# Patient Record
Sex: FEMALE | Race: BLACK OR AFRICAN AMERICAN | NOT HISPANIC OR LATINO | URBAN - METROPOLITAN AREA
[De-identification: names, ages, dates, MRNs, and addresses within clinical notes are randomized per-mention and may not be internally consistent; named-entity substitution may affect disease eponyms.]

---

## 2020-09-20 ENCOUNTER — OUTPATIENT (OUTPATIENT)
Dept: OUTPATIENT SERVICES | Facility: HOSPITAL | Age: 35
LOS: 1 days | Discharge: HOME | End: 2020-09-20

## 2020-09-20 DIAGNOSIS — R71.8 OTHER ABNORMALITY OF RED BLOOD CELLS: ICD-10-CM

## 2020-09-23 ENCOUNTER — APPOINTMENT (OUTPATIENT)
Dept: HEMATOLOGY ONCOLOGY | Facility: CLINIC | Age: 35
End: 2020-09-23
Payer: COMMERCIAL

## 2020-09-23 DIAGNOSIS — Z78.9 OTHER SPECIFIED HEALTH STATUS: ICD-10-CM

## 2020-09-23 DIAGNOSIS — Z83.3 FAMILY HISTORY OF DIABETES MELLITUS: ICD-10-CM

## 2020-09-23 DIAGNOSIS — D56.9 THALASSEMIA, UNSPECIFIED: ICD-10-CM

## 2020-09-23 PROCEDURE — 99204 OFFICE O/P NEW MOD 45 MIN: CPT | Mod: 95

## 2020-09-28 ENCOUNTER — RESULT REVIEW (OUTPATIENT)
Age: 35
End: 2020-09-28

## 2020-09-28 ENCOUNTER — LABORATORY RESULT (OUTPATIENT)
Age: 35
End: 2020-09-28

## 2020-09-28 ENCOUNTER — APPOINTMENT (OUTPATIENT)
Dept: HEMATOLOGY ONCOLOGY | Facility: CLINIC | Age: 35
End: 2020-09-28

## 2020-09-28 LAB
BASOPHILS # BLD AUTO: 0.05 K/UL — SIGNIFICANT CHANGE UP (ref 0–0.2)
BASOPHILS NFR BLD AUTO: 0.8 % — SIGNIFICANT CHANGE UP (ref 0–2)
EOSINOPHIL # BLD AUTO: 0.07 K/UL — SIGNIFICANT CHANGE UP (ref 0–0.5)
EOSINOPHIL NFR BLD AUTO: 1.1 % — SIGNIFICANT CHANGE UP (ref 0–6)
HCT VFR BLD CALC: 37.8 % — SIGNIFICANT CHANGE UP (ref 34.5–45)
HGB BLD-MCNC: 11.6 G/DL — SIGNIFICANT CHANGE UP (ref 11.5–15.5)
IMM GRANULOCYTES NFR BLD AUTO: 0.2 % — SIGNIFICANT CHANGE UP (ref 0–1.5)
LYMPHOCYTES # BLD AUTO: 3.17 K/UL — SIGNIFICANT CHANGE UP (ref 1–3.3)
LYMPHOCYTES # BLD AUTO: 52 % — HIGH (ref 13–44)
MCHC RBC-ENTMCNC: 21.8 PG — LOW (ref 27–34)
MCHC RBC-ENTMCNC: 30.7 G/DL — LOW (ref 32–36)
MCV RBC AUTO: 71.1 FL — LOW (ref 80–100)
MONOCYTES # BLD AUTO: 0.56 K/UL — SIGNIFICANT CHANGE UP (ref 0–0.9)
MONOCYTES NFR BLD AUTO: 9.2 % — SIGNIFICANT CHANGE UP (ref 2–14)
NEUTROPHILS # BLD AUTO: 2.24 K/UL — SIGNIFICANT CHANGE UP (ref 1.8–7.4)
NEUTROPHILS NFR BLD AUTO: 36.7 % — LOW (ref 43–77)
NRBC # BLD: 0 /100 WBCS — SIGNIFICANT CHANGE UP (ref 0–0)
PLATELET # BLD AUTO: 433 K/UL — HIGH (ref 150–400)
RBC # BLD: 5.32 M/UL — HIGH (ref 3.8–5.2)
RBC # FLD: 18.1 % — HIGH (ref 10.3–14.5)
WBC # BLD: 6.1 K/UL — SIGNIFICANT CHANGE UP (ref 3.8–10.5)
WBC # FLD AUTO: 6.1 K/UL — SIGNIFICANT CHANGE UP (ref 3.8–10.5)

## 2020-09-29 LAB
RETICS #: 90.3 K/UL — SIGNIFICANT CHANGE UP (ref 25–125)
RETICS/RBC NFR: 1.7 % — SIGNIFICANT CHANGE UP (ref 0.5–2.5)

## 2020-10-06 ENCOUNTER — TRANSCRIPTION ENCOUNTER (OUTPATIENT)
Age: 35
End: 2020-10-06

## 2020-10-06 DIAGNOSIS — E61.1 IRON DEFICIENCY: ICD-10-CM

## 2020-10-06 LAB
ALBUMIN SERPL ELPH-MCNC: 4.4 G/DL
ALP BLD-CCNC: 95 U/L
ALT SERPL-CCNC: 29 U/L
ANION GAP SERPL CALC-SCNC: 11 MMOL/L
AST SERPL-CCNC: 21 U/L
BILIRUB INDIRECT SERPL-MCNC: 0.1 MG/DL
BILIRUB SERPL-MCNC: 0.2 MG/DL
BUN SERPL-MCNC: 9 MG/DL
CALCIUM SERPL-MCNC: 9 MG/DL
CHLORIDE SERPL-SCNC: 106 MMOL/L
CO2 SERPL-SCNC: 22 MMOL/L
CREAT SERPL-MCNC: 0.79 MG/DL
FERRITIN SERPL-MCNC: 23 NG/ML
FOLATE SERPL-MCNC: 17.9 NG/ML
GLUCOSE SERPL-MCNC: 105 MG/DL
HGB A MFR BLD: 97.6 %
HGB A2 MFR BLD: 2.4 %
HGB FRACT BLD-IMP: NORMAL
IRON SATN MFR SERPL: 10 %
IRON SERPL-MCNC: 44 UG/DL
JAK2RLX: NORMAL
LDH SERPL-CCNC: 203 U/L
POTASSIUM SERPL-SCNC: 4.2 MMOL/L
PROT SERPL-MCNC: 7.3 G/DL
SODIUM SERPL-SCNC: 139 MMOL/L
TIBC SERPL-MCNC: 426 UG/DL
UIBC SERPL-MCNC: 382 UG/DL
VIT B12 SERPL-MCNC: 777 PG/ML

## 2020-10-06 RX ORDER — FERROUS GLUCONATE 324(38)MG
324 (38 FE) TABLET ORAL DAILY
Qty: 90 | Refills: 1 | Status: ACTIVE | COMMUNITY
Start: 2020-10-06 | End: 1900-01-01

## 2020-10-06 NOTE — HISTORY OF PRESENT ILLNESS
[de-identified] : This is a 35 year old woman who  resents for the evaluation of an elevated RBC count. Hg 13.0 HCT 41.7% with a microcytic MCV 73.4fl, platelets 478 in March. \par Patient had bloodwork from the prior year.  Counts were the same.  \par \par Menstrual cycle has changed.  Patient 5/3 weight gain over last year.  Cycles have been heavier lately.  3-4 pads a day lasts an average of 5.  \par Patient of  descent.  Unsure of which region.  \par Patient was concerned about an ultrasound that was done recently. Report had stated something about a blood cancer.  REviewed the report with her.  THe ultrasound was actually normal,  in the history remarks it states she has a history of essential thrombocytosis.  \par \par Patient has history of a uterine myoma/fibroid.

## 2020-10-06 NOTE — ASSESSMENT
[FreeTextEntry1] : This is a 35 year old woman that presents for the evaluation of a mild thrombocytosis.    Platelet count was  478,000 in the setting of a normal hg of 13 with microcytosis 73.4fl.  Explained to her that it is highly unlikely that she has essential thrombocytosis, will check a GUILLERMINA 2 mutation to be sure.  THis usually comes at a more advanced age.  WIll check Hemoglobin electrophoresis, rule out thalassemia trait, check Iron studies, more likely scenario is a mild iron deficiency insufficient to cause an anemia, and leading to a reactive thrombocytosis.  \par \par Addendum 10/6/20\par Spoke to patient re: results. Patient's Hemoglobin electrophoresis normal, microcytosis does not appear to be from thalassemia trait.  Ferritin on lower end of normal at 23 with a high unsaturated TIBC and low % sat, suggestive of a mild iron deficiency. Recommended patient take an iron supplement ferrous gluconate 324(38) mg daily for 3 months and return to recheck.  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

## 2022-04-13 ENCOUNTER — RESULT REVIEW (OUTPATIENT)
Age: 37
End: 2022-04-13

## 2024-01-04 ENCOUNTER — NON-APPOINTMENT (OUTPATIENT)
Age: 39
End: 2024-01-04

## 2024-01-05 ENCOUNTER — APPOINTMENT (OUTPATIENT)
Dept: OBGYN | Facility: CLINIC | Age: 39
End: 2024-01-05
Payer: COMMERCIAL

## 2024-01-05 VITALS
SYSTOLIC BLOOD PRESSURE: 136 MMHG | WEIGHT: 156 LBS | BODY MASS INDEX: 28.71 KG/M2 | DIASTOLIC BLOOD PRESSURE: 93 MMHG | HEART RATE: 101 BPM | HEIGHT: 62 IN

## 2024-01-05 PROCEDURE — 99243 OFF/OP CNSLTJ NEW/EST LOW 30: CPT

## 2024-01-18 ENCOUNTER — OUTPATIENT (OUTPATIENT)
Dept: OUTPATIENT SERVICES | Facility: HOSPITAL | Age: 39
LOS: 1 days | End: 2024-01-18
Payer: COMMERCIAL

## 2024-01-18 ENCOUNTER — APPOINTMENT (OUTPATIENT)
Dept: MRI IMAGING | Facility: CLINIC | Age: 39
End: 2024-01-18
Payer: COMMERCIAL

## 2024-01-18 DIAGNOSIS — D25.9 LEIOMYOMA OF UTERUS, UNSPECIFIED: ICD-10-CM

## 2024-01-18 PROCEDURE — 72197 MRI PELVIS W/O & W/DYE: CPT | Mod: 26

## 2024-01-18 PROCEDURE — 72197 MRI PELVIS W/O & W/DYE: CPT

## 2024-01-18 PROCEDURE — A9585: CPT

## 2024-01-19 ENCOUNTER — TRANSCRIPTION ENCOUNTER (OUTPATIENT)
Age: 39
End: 2024-01-19

## 2024-02-18 NOTE — PHYSICAL EXAM
[Appropriately responsive] : appropriately responsive [Alert] : alert [No Acute Distress] : no acute distress [No Lymphadenopathy] : no lymphadenopathy [Soft] : soft [Non-tender] : non-tender [Non-distended] : non-distended [No HSM] : No HSM [No Lesions] : no lesions [No Mass] : no mass [Oriented x3] : oriented x3 [Enlarged ___ wks] : enlarged [unfilled] ~Uweeks [Uterine Adnexae] : normal [FreeTextEntry6] : 16 week mobile uterus, subserosal fibroids palpated

## 2024-02-18 NOTE — PLAN
[FreeTextEntry1] : 37 yo, fibroids, asymptomatic - Discussed the option of continued conservative observation of fibroids vs surgical removal. The risks of surgery include possible infertility and adhesion formation vs the risks of conservative follow-up: infertility, miscarriage and PTL were discussed at length. The possible need for primary  after myomectomy was also outlined. - book laparoscopic myomectomy, possible xlap - rx pelvic MRI and will review findings and modality of surgery

## 2024-02-18 NOTE — END OF VISIT
[FreeTextEntry3] :    I, Patricia Anna, acted as a scribe on behalf of Dr. Kishore Cotto on 01/05/2024.   All medical entries made by the scribe were at my, Dr. Kishore Cotto's, direction and personally dictated by me on 01/05/2024. I have reviewed the chart and agree that the record accurately reflects my personal performance of the history, physical exam, assessment, and plan. I have also personally directed, reviewed, and agreed with the chart. [Time Spent: ___ minutes] : I have spent [unfilled] minutes of time on the encounter. [>50% of the face to face encounter time was spent on counseling and/or coordination of care for ___] : Greater than 50% of the face to face encounter time was spent on counseling and/or coordination of care for [unfilled]

## 2024-02-18 NOTE — CONSULT LETTER
[Dear  ___] : Dear ~TIAN, [Consult Letter:] : I had the pleasure of evaluating your patient, [unfilled]. [Please see my note below.] : Please see my note below. [Consult Closing:] : Thank you very much for allowing me to participate in the care of this patient.  If you have any questions, please do not hesitate to contact me. [Sincerely,] : Sincerely, [FreeTextEntry2] : Lakesha Newell, DNP [address] [facility], [address], [place] [id number] [FreeTextEntry3] : Kishore Cotto MD

## 2024-02-18 NOTE — HISTORY OF PRESENT ILLNESS
[FreeTextEntry1] : 37 yo  presents for consultation regarding fibroids. Pt reports normal periods lasting 5 days, normal flow. Pt was diagnosed with fibroids in her early 20s. Pt reports her fibroids continue to be asymptomatic.   OBH:  GYNH: hx of fibroids, serum positive for HSV, unknown type, denies hx of abnormal pap PMH: none PSH: D+C/TOP x2 FH: none Allergies: NKDA  Pelvic US 23 Uterus: Size: 14.88 x 12.43 x 9.22 cm Position: Anteverted Multiple myomas noted, few discreet larger ones are as follows and they are slightly increased in size as compared from previous scan  Myomas: left lateral subserosal - 7.64 x 8.18 x 7.58 cm left lateral subserosal - 7.2 x 7.97 x 7.76 cm anterior intramural - 3.72 x 3.83 x 4.81 cm anterior ERICA subserosal - 4.51 x 4.15 x 4.14 cm  Endometrium Thickness: 6.36 mm  Cervix: visualized, normal appearance Cul de Sac: no fluid was demonstrated Right ovary: no visible due to myomas Left ovary: 3.69 x 2.98 x 3.18 cm, dominant follicle noted measuring 2.5 cm

## 2024-02-20 ENCOUNTER — NON-APPOINTMENT (OUTPATIENT)
Age: 39
End: 2024-02-20

## 2024-03-28 ENCOUNTER — NON-APPOINTMENT (OUTPATIENT)
Age: 39
End: 2024-03-28

## 2024-04-04 ENCOUNTER — OUTPATIENT (OUTPATIENT)
Dept: OUTPATIENT SERVICES | Facility: HOSPITAL | Age: 39
LOS: 1 days | End: 2024-04-04
Payer: COMMERCIAL

## 2024-04-04 VITALS
RESPIRATION RATE: 18 BRPM | OXYGEN SATURATION: 100 % | WEIGHT: 149.91 LBS | SYSTOLIC BLOOD PRESSURE: 139 MMHG | HEIGHT: 62 IN | TEMPERATURE: 98 F | DIASTOLIC BLOOD PRESSURE: 89 MMHG | HEART RATE: 79 BPM

## 2024-04-04 DIAGNOSIS — D25.9 LEIOMYOMA OF UTERUS, UNSPECIFIED: ICD-10-CM

## 2024-04-04 DIAGNOSIS — Z29.9 ENCOUNTER FOR PROPHYLACTIC MEASURES, UNSPECIFIED: ICD-10-CM

## 2024-04-04 DIAGNOSIS — Z01.818 ENCOUNTER FOR OTHER PREPROCEDURAL EXAMINATION: ICD-10-CM

## 2024-04-04 LAB
ANION GAP SERPL CALC-SCNC: 13 MMOL/L — SIGNIFICANT CHANGE UP (ref 5–17)
BLD GP AB SCN SERPL QL: NEGATIVE — SIGNIFICANT CHANGE UP
BUN SERPL-MCNC: 12 MG/DL — SIGNIFICANT CHANGE UP (ref 7–23)
CALCIUM SERPL-MCNC: 9.7 MG/DL — SIGNIFICANT CHANGE UP (ref 8.4–10.5)
CHLORIDE SERPL-SCNC: 104 MMOL/L — SIGNIFICANT CHANGE UP (ref 96–108)
CO2 SERPL-SCNC: 22 MMOL/L — SIGNIFICANT CHANGE UP (ref 22–31)
CREAT SERPL-MCNC: 0.73 MG/DL — SIGNIFICANT CHANGE UP (ref 0.5–1.3)
EGFR: 109 ML/MIN/1.73M2 — SIGNIFICANT CHANGE UP
GLUCOSE SERPL-MCNC: 85 MG/DL — SIGNIFICANT CHANGE UP (ref 70–99)
HCT VFR BLD CALC: 36.6 % — SIGNIFICANT CHANGE UP (ref 34.5–45)
HGB BLD-MCNC: 11.1 G/DL — LOW (ref 11.5–15.5)
MCHC RBC-ENTMCNC: 19.6 PG — LOW (ref 27–34)
MCHC RBC-ENTMCNC: 30.3 GM/DL — LOW (ref 32–36)
MCV RBC AUTO: 64.6 FL — LOW (ref 80–100)
NRBC # BLD: 0 /100 WBCS — SIGNIFICANT CHANGE UP (ref 0–0)
PLATELET # BLD AUTO: 465 K/UL — HIGH (ref 150–400)
POTASSIUM SERPL-MCNC: 3.9 MMOL/L — SIGNIFICANT CHANGE UP (ref 3.5–5.3)
POTASSIUM SERPL-SCNC: 3.9 MMOL/L — SIGNIFICANT CHANGE UP (ref 3.5–5.3)
RBC # BLD: 5.67 M/UL — HIGH (ref 3.8–5.2)
RBC # FLD: 18.9 % — HIGH (ref 10.3–14.5)
RH IG SCN BLD-IMP: POSITIVE — SIGNIFICANT CHANGE UP
SODIUM SERPL-SCNC: 139 MMOL/L — SIGNIFICANT CHANGE UP (ref 135–145)
WBC # BLD: 6.38 K/UL — SIGNIFICANT CHANGE UP (ref 3.8–10.5)
WBC # FLD AUTO: 6.38 K/UL — SIGNIFICANT CHANGE UP (ref 3.8–10.5)

## 2024-04-04 PROCEDURE — 86901 BLOOD TYPING SEROLOGIC RH(D): CPT

## 2024-04-04 PROCEDURE — 85027 COMPLETE CBC AUTOMATED: CPT

## 2024-04-04 PROCEDURE — 86900 BLOOD TYPING SEROLOGIC ABO: CPT

## 2024-04-04 PROCEDURE — 80048 BASIC METABOLIC PNL TOTAL CA: CPT

## 2024-04-04 PROCEDURE — G0463: CPT

## 2024-04-04 PROCEDURE — 87086 URINE CULTURE/COLONY COUNT: CPT

## 2024-04-04 PROCEDURE — 86850 RBC ANTIBODY SCREEN: CPT

## 2024-04-04 NOTE — H&P PST ADULT - ATTENDING COMMENTS
GYN Attg:  Pt seen and assessed. Pt consented for Exploratory laparotomy, abdominal myomectomy, Right ovarian cystectomy.  PAULETTE Cotto MD

## 2024-04-04 NOTE — H&P PST ADULT - NSANTHOSAYNRD_GEN_A_CORE
No. FLAKO screening performed.  STOP BANG Legend: 0-2 = LOW Risk; 3-4 = INTERMEDIATE Risk; 5-8 = HIGH Risk

## 2024-04-04 NOTE — H&P PST ADULT - PROBLEM SELECTOR PLAN 1
Preop instructions and chlorhexidine soap given  Labs CBC BMP T&S UC performed in PST  GYN ERP given  ABO DOS and UCG DOS

## 2024-04-04 NOTE — H&P PST ADULT - ASSESSMENT
DASI Score:  DASI Activity: able to go up one flight of stairs or walk 1-2 blocks with out difficulty  Loose or removable teeth: denies      CAPRINI SCORE    AGE RELATED RISK FACTORS                                                       MOBILITY RELATED FACTORS  [ ] Age 41-60 years                                            (1 Point)                  [ ] Bed rest                                                        (1 Point)  [ ] Age: 61-74 years                                           (2 Points)                [ ] Plaster cast                                                   (2 Points)  [ ] Age= 75 years                                              (3 Points)                 [ ] Bed bound for more than 72 hours                   (2 Points)    DISEASE RELATED RISK FACTORS                                               GENDER SPECIFIC FACTORS  [ ] Edema in the lower extremities                       (1 Point)                  [ ] Pregnancy                                                     (1 Point)  [ ] Varicose veins                                               (1 Point)                  [ ] Post-partum < 6 weeks                                   (1 Point)             [ ] BMI > 25 Kg/m2                                            (1 Point)                  [ ] Hormonal therapy  or oral contraception            (1 Point)                 [ ] Sepsis (in the previous month)                        (1 Point)                  [ ] History of pregnancy complications  [ ] Pneumonia or serious lung disease                                               [ ] Unexplained or recurrent                       (1 Point)           (in the previous month)                               (1 Point)  [ ] Abnormal pulmonary function test                     (1 Point)                 SURGERY RELATED RISK FACTORS  [ ] Acute myocardial infarction                              (1 Point)                 [ ]  Section                                            (1 Point)  [ ] Congestive heart failure (in the previous month)  (1 Point)                 [ ] Minor surgery                                                 (1 Point)   [ ] Inflammatory bowel disease                             (1 Point)                 [ ] Arthroscopic surgery                                        (2 Points)  [ ] Central venous access                                    (2 Points)                [ ] General surgery lasting more than 45 minutes   (2 Points)       [ ] Stroke (in the previous month)                          (5 Points)               [ ] Elective arthroplasty                                        (5 Points)                                                                                                                                               HEMATOLOGY RELATED FACTORS                                                 TRAUMA RELATED RISK FACTORS  [ ] Prior episodes of VTE                                     (3 Points)                 [ ] Fracture of the hip, pelvis, or leg                       (5 Points)  [ ] Positive family history for VTE                         (3 Points)                 [ ] Acute spinal cord injury (in the previous month)  (5 Points)  [ ] Prothrombin 24921 A                                      (3 Points)                 [ ] Paralysis  (less than 1 month)                          (5 Points)  [ ] Factor V Leiden                                             (3 Points)                 [ ] Multiple Trauma within 1 month                         (5 Points)  [ ] Lupus anticoagulants                                     (3 Points)                                                           [ ] Anticardiolipin antibodies                                (3 Points)                                                       [ ] High homocysteine in the blood                      (3 Points)                                             [ ] Other congenital or acquired thrombophilia       (3 Points)                                                [ ] Heparin induced thrombocytopenia                  (3 Points)                                          Total Score [          ] DASI Score: 8  DASI Activity:  Cardio weekly   able to go up one flight of stairs or walk 1-2 blocks with out difficulty  Loose or removable teeth: denies      CAPRINI SCORE    AGE RELATED RISK FACTORS                                                       MOBILITY RELATED FACTORS  [ ] Age 41-60 years                                            (1 Point)                  [ ] Bed rest                                                        (1 Point)  [ ] Age: 61-74 years                                           (2 Points)                [ ] Plaster cast                                                   (2 Points)  [ ] Age= 75 years                                              (3 Points)                 [ ] Bed bound for more than 72 hours                   (2 Points)    DISEASE RELATED RISK FACTORS                                               GENDER SPECIFIC FACTORS  [ ] Edema in the lower extremities                       (1 Point)                  [ ] Pregnancy                                                     (1 Point)  [ ] Varicose veins                                               (1 Point)                  [ ] Post-partum < 6 weeks                                   (1 Point)             [ ] BMI > 25 Kg/m2                                            (1 Point)                  [ ] Hormonal therapy  or oral contraception            (1 Point)                 [ ] Sepsis (in the previous month)                        (1 Point)                  [ ] History of pregnancy complications  [ ] Pneumonia or serious lung disease                                               [ ] Unexplained or recurrent                       (1 Point)           (in the previous month)                               (1 Point)  [ ] Abnormal pulmonary function test                     (1 Point)                 SURGERY RELATED RISK FACTORS  [ ] Acute myocardial infarction                              (1 Point)                 [ ]  Section                                            (1 Point)  [ ] Congestive heart failure (in the previous month)  (1 Point)                 [ ] Minor surgery                                                 (1 Point)   [ ] Inflammatory bowel disease                             (1 Point)                 [ ] Arthroscopic surgery                                        (2 Points)  [ ] Central venous access                                    (2 Points)                [ x] General surgery lasting more than 45 minutes   (2 Points)       [ ] Stroke (in the previous month)                          (5 Points)               [ ] Elective arthroplasty                                        (5 Points)                                                                                                                                               HEMATOLOGY RELATED FACTORS                                                 TRAUMA RELATED RISK FACTORS  [ ] Prior episodes of VTE                                     (3 Points)                 [ ] Fracture of the hip, pelvis, or leg                       (5 Points)  [ ] Positive family history for VTE                         (3 Points)                 [ ] Acute spinal cord injury (in the previous month)  (5 Points)  [ ] Prothrombin 93850 A                                      (3 Points)                 [ ] Paralysis  (less than 1 month)                          (5 Points)  [ ] Factor V Leiden                                             (3 Points)                 [ ] Multiple Trauma within 1 month                         (5 Points)  [ ] Lupus anticoagulants                                     (3 Points)                                                           [ ] Anticardiolipin antibodies                                (3 Points)                                                       [ ] High homocysteine in the blood                      (3 Points)                                             [ ] Other congenital or acquired thrombophilia       (3 Points)                                                [ ] Heparin induced thrombocytopenia                  (3 Points)                                          Total Score [  2  ]

## 2024-04-04 NOTE — H&P PST ADULT - HISTORY OF PRESENT ILLNESS
This is a 38 year old female       Presenting to PST for scheduled Exploratory laparotomy, abdomnal myomectomy, Right ovarain cystectomy, ERP on 4/25/24 with Dr. Olivares. This is a 38 year old female with past medical history of uterine fibroids since her early 20's. Report having heavy and painful menses. Abdominal MRI revealed multiple uterine fibroids.  Presenting to PST for scheduled Exploratory laparotomy, abdominal myomectomy, Right ovarian cystectomy, ERP on 4/25/24 with Dr. Olivares.

## 2024-04-06 LAB
CULTURE RESULTS: SIGNIFICANT CHANGE UP
SPECIMEN SOURCE: SIGNIFICANT CHANGE UP

## 2024-04-24 ENCOUNTER — TRANSCRIPTION ENCOUNTER (OUTPATIENT)
Age: 39
End: 2024-04-24

## 2024-04-25 ENCOUNTER — APPOINTMENT (OUTPATIENT)
Dept: OBGYN | Facility: HOSPITAL | Age: 39
End: 2024-04-25

## 2024-04-25 ENCOUNTER — INPATIENT (INPATIENT)
Facility: HOSPITAL | Age: 39
LOS: 3 days | Discharge: HOME | DRG: 761 | End: 2024-04-29
Attending: OBSTETRICS & GYNECOLOGY | Admitting: OBSTETRICS & GYNECOLOGY
Payer: COMMERCIAL

## 2024-04-25 VITALS
TEMPERATURE: 98 F | HEIGHT: 62.01 IN | OXYGEN SATURATION: 99 % | SYSTOLIC BLOOD PRESSURE: 134 MMHG | WEIGHT: 149.91 LBS | HEART RATE: 74 BPM | RESPIRATION RATE: 18 BRPM | DIASTOLIC BLOOD PRESSURE: 90 MMHG

## 2024-04-25 DIAGNOSIS — D25.9 LEIOMYOMA OF UTERUS, UNSPECIFIED: ICD-10-CM

## 2024-04-25 LAB — HCG UR QL: NEGATIVE — SIGNIFICANT CHANGE UP

## 2024-04-25 PROCEDURE — 58925 REMOVAL OF OVARIAN CYST(S): CPT

## 2024-04-25 PROCEDURE — 58146 MYOMECTOMY ABDOM COMPLEX: CPT

## 2024-04-25 PROCEDURE — 88305 TISSUE EXAM BY PATHOLOGIST: CPT | Mod: 26

## 2024-04-25 DEVICE — INTERCEED 5 X 6" XL: Type: IMPLANTABLE DEVICE | Site: RIGHT, ABDOMINAL CAVITY | Status: FUNCTIONAL

## 2024-04-25 DEVICE — VISTASEAL FIBRIN HUMAN 4ML: Type: IMPLANTABLE DEVICE | Site: RIGHT, ABDOMINAL CAVITY | Status: FUNCTIONAL

## 2024-04-25 RX ORDER — GABAPENTIN 400 MG/1
600 CAPSULE ORAL ONCE
Refills: 0 | Status: COMPLETED | OUTPATIENT
Start: 2024-04-25 | End: 2024-04-25

## 2024-04-25 RX ORDER — CELECOXIB 200 MG/1
400 CAPSULE ORAL ONCE
Refills: 0 | Status: COMPLETED | OUTPATIENT
Start: 2024-04-25 | End: 2024-04-25

## 2024-04-25 RX ORDER — ACETAMINOPHEN 500 MG
1000 TABLET ORAL ONCE
Refills: 0 | Status: COMPLETED | OUTPATIENT
Start: 2024-04-25 | End: 2024-04-25

## 2024-04-25 RX ORDER — HEPARIN SODIUM 5000 [USP'U]/ML
5000 INJECTION INTRAVENOUS; SUBCUTANEOUS EVERY 12 HOURS
Refills: 0 | Status: DISCONTINUED | OUTPATIENT
Start: 2024-04-25 | End: 2024-04-29

## 2024-04-25 RX ORDER — CEFOTETAN DISODIUM 1 G
2 VIAL (EA) INJECTION ONCE
Refills: 0 | Status: DISCONTINUED | OUTPATIENT
Start: 2024-04-25 | End: 2024-04-25

## 2024-04-25 RX ORDER — OXYCODONE HYDROCHLORIDE 5 MG/1
5 TABLET ORAL EVERY 6 HOURS
Refills: 0 | Status: DISCONTINUED | OUTPATIENT
Start: 2024-04-25 | End: 2024-04-29

## 2024-04-25 RX ORDER — ACETAMINOPHEN 500 MG
1000 TABLET ORAL EVERY 6 HOURS
Refills: 0 | Status: COMPLETED | OUTPATIENT
Start: 2024-04-25 | End: 2024-04-26

## 2024-04-25 RX ORDER — ONDANSETRON 8 MG/1
4 TABLET, FILM COATED ORAL ONCE
Refills: 0 | Status: DISCONTINUED | OUTPATIENT
Start: 2024-04-25 | End: 2024-04-25

## 2024-04-25 RX ORDER — SODIUM CHLORIDE 9 MG/ML
1000 INJECTION, SOLUTION INTRAVENOUS
Refills: 0 | Status: DISCONTINUED | OUTPATIENT
Start: 2024-04-25 | End: 2024-04-25

## 2024-04-25 RX ORDER — LIDOCAINE HCL 20 MG/ML
0.2 VIAL (ML) INJECTION ONCE
Refills: 0 | Status: DISCONTINUED | OUTPATIENT
Start: 2024-04-25 | End: 2024-04-25

## 2024-04-25 RX ORDER — SODIUM CHLORIDE 9 MG/ML
1000 INJECTION, SOLUTION INTRAVENOUS
Refills: 0 | Status: DISCONTINUED | OUTPATIENT
Start: 2024-04-25 | End: 2024-04-26

## 2024-04-25 RX ORDER — KETOROLAC TROMETHAMINE 30 MG/ML
30 SYRINGE (ML) INJECTION EVERY 6 HOURS
Refills: 0 | Status: DISCONTINUED | OUTPATIENT
Start: 2024-04-25 | End: 2024-04-27

## 2024-04-25 RX ORDER — INFLUENZA VIRUS VACCINE 15; 15; 15; 15 UG/.5ML; UG/.5ML; UG/.5ML; UG/.5ML
0.5 SUSPENSION INTRAMUSCULAR ONCE
Refills: 0 | Status: DISCONTINUED | OUTPATIENT
Start: 2024-04-25 | End: 2024-04-29

## 2024-04-25 RX ORDER — MORPHINE SULFATE 50 MG/1
0.15 CAPSULE, EXTENDED RELEASE ORAL ONCE
Refills: 0 | Status: DISCONTINUED | OUTPATIENT
Start: 2024-04-25 | End: 2024-04-26

## 2024-04-25 RX ORDER — HYDROMORPHONE HYDROCHLORIDE 2 MG/ML
0.5 INJECTION INTRAMUSCULAR; INTRAVENOUS; SUBCUTANEOUS
Refills: 0 | Status: DISCONTINUED | OUTPATIENT
Start: 2024-04-25 | End: 2024-04-25

## 2024-04-25 RX ORDER — OXYCODONE HYDROCHLORIDE 5 MG/1
5 TABLET ORAL ONCE
Refills: 0 | Status: DISCONTINUED | OUTPATIENT
Start: 2024-04-25 | End: 2024-04-25

## 2024-04-25 RX ORDER — NALOXONE HYDROCHLORIDE 4 MG/.1ML
0.1 SPRAY NASAL
Refills: 0 | Status: DISCONTINUED | OUTPATIENT
Start: 2024-04-25 | End: 2024-04-26

## 2024-04-25 RX ORDER — CHLORHEXIDINE GLUCONATE 213 G/1000ML
1 SOLUTION TOPICAL ONCE
Refills: 0 | Status: COMPLETED | OUTPATIENT
Start: 2024-04-25 | End: 2024-04-25

## 2024-04-25 RX ORDER — NALBUPHINE HYDROCHLORIDE 10 MG/ML
2.5 INJECTION, SOLUTION INTRAMUSCULAR; INTRAVENOUS; SUBCUTANEOUS EVERY 6 HOURS
Refills: 0 | Status: DISCONTINUED | OUTPATIENT
Start: 2024-04-25 | End: 2024-04-26

## 2024-04-25 RX ORDER — SODIUM CHLORIDE 9 MG/ML
3 INJECTION INTRAMUSCULAR; INTRAVENOUS; SUBCUTANEOUS EVERY 8 HOURS
Refills: 0 | Status: DISCONTINUED | OUTPATIENT
Start: 2024-04-25 | End: 2024-04-25

## 2024-04-25 RX ADMIN — Medication 1000 MILLIGRAM(S): at 07:11

## 2024-04-25 RX ADMIN — HYDROMORPHONE HYDROCHLORIDE 0.5 MILLIGRAM(S): 2 INJECTION INTRAMUSCULAR; INTRAVENOUS; SUBCUTANEOUS at 12:35

## 2024-04-25 RX ADMIN — OXYCODONE HYDROCHLORIDE 5 MILLIGRAM(S): 5 TABLET ORAL at 20:50

## 2024-04-25 RX ADMIN — CHLORHEXIDINE GLUCONATE 1 APPLICATION(S): 213 SOLUTION TOPICAL at 07:13

## 2024-04-25 RX ADMIN — CELECOXIB 400 MILLIGRAM(S): 200 CAPSULE ORAL at 07:12

## 2024-04-25 RX ADMIN — GABAPENTIN 600 MILLIGRAM(S): 400 CAPSULE ORAL at 07:11

## 2024-04-25 RX ADMIN — SODIUM CHLORIDE 75 MILLILITER(S): 9 INJECTION, SOLUTION INTRAVENOUS at 23:24

## 2024-04-25 RX ADMIN — HEPARIN SODIUM 5000 UNIT(S): 5000 INJECTION INTRAVENOUS; SUBCUTANEOUS at 20:50

## 2024-04-25 RX ADMIN — Medication 400 MILLIGRAM(S): at 17:11

## 2024-04-25 RX ADMIN — OXYCODONE HYDROCHLORIDE 5 MILLIGRAM(S): 5 TABLET ORAL at 21:30

## 2024-04-25 RX ADMIN — SODIUM CHLORIDE 75 MILLILITER(S): 9 INJECTION, SOLUTION INTRAVENOUS at 15:30

## 2024-04-25 RX ADMIN — Medication 30 MILLIGRAM(S): at 17:11

## 2024-04-25 RX ADMIN — HYDROMORPHONE HYDROCHLORIDE 0.5 MILLIGRAM(S): 2 INJECTION INTRAMUSCULAR; INTRAVENOUS; SUBCUTANEOUS at 12:50

## 2024-04-25 RX ADMIN — Medication 30 MILLIGRAM(S): at 23:23

## 2024-04-25 NOTE — BRIEF OPERATIVE NOTE - OPERATION/FINDINGS
Examination under anesthesia revealed normal external female genitalia.   Anteverted uterus approximately 16 week sized. Right adnexal fullness  Intraoperative findings showed enlarged myomatous uterus.  Normal appearing right and left fallopian tubes and left ovaries.  Right ovary with a 5cm dermoid cyst.

## 2024-04-25 NOTE — BRIEF OPERATIVE NOTE - NSICDXBRIEFPREOP_GEN_ALL_CORE_FT
PRE-OP DIAGNOSIS:  Right ovarian cyst 25-Apr-2024 12:47:33  Chilango Hurst  Fibroid uterus 25-Apr-2024 12:47:44  Chilango Hurst

## 2024-04-25 NOTE — PRE-OP CHECKLIST - CHLOROHEXIDINE WASH IN ASU
- marked urinary retention and bladder distention on admission  - failed voiding trial and urinary catheter replaced  - will plan to discharge with [place] catheter  - status post empiric 3 day course of IV ceftriaxone 25-Apr-2024 05:40

## 2024-04-25 NOTE — PRE-ANESTHESIA EVALUATION ADULT - NSANTHPMHFT_GEN_ALL_CORE
37 y/o F PMH uterine fibroids to undergo ERP exploratory laparotomy, abdominal myomectomy, right ovarian cystectomy.

## 2024-04-25 NOTE — PRE-ANESTHESIA EVALUATION ADULT - NSANTHLABRESULTSFT_GEN_ALL_CORE
See HPI See HPI See HPI See HPI See HPI Reviewed See HPI See HPI See HPI See HPI See HPI See HPI See HPI See HPI See HPI See HPI See HPI See HPI

## 2024-04-25 NOTE — BRIEF OPERATIVE NOTE - NSICDXBRIEFPOSTOP_GEN_ALL_CORE_FT
POST-OP DIAGNOSIS:  Uterine leiomyoma 25-Apr-2024 12:48:06  Chilango Hurst  Right ovarian cyst 25-Apr-2024 12:47:56  Chilango Hurst

## 2024-04-25 NOTE — CHART NOTE - NSCHARTNOTEFT_GEN_A_CORE
PA POST-OP CHECK    S: Patient seen and evaluated at bedside.  Pt awake and alert resting comfortably in bed vs.  Pt sleeping, easily arousable.  Patient reports pain controlled with analgesia. Pt denies N/V, SOB, CP, palpitations.   Not OOB yet.    O:   T(C): 36.1 (04-25-24 @ 12:10), Max: 36.1 (04-25-24 @ 12:10)  HR: 75 (04-25-24 @ 14:30) (75 - 91)  BP: 98/60 (04-25-24 @ 14:30) (91/50 - 111/56)  RR: 16 (04-25-24 @ 14:30) (16 - 16)  SpO2: 100% (04-25-24 @ 14:30) (94% - 100%)  Wt(kg): --  I&O's Summary    25 Apr 2024 07:01  -  25 Apr 2024 14:34  --------------------------------------------------------  IN: 325 mL / OUT: 80 mL / NET: 245 mL        Gen: Resting comfortably in bed, NAD  CV: S1S2, RRR  Lungs: CTA B/L  Abd: soft, appropriately tender, occassional BS x 4 quadrants.    Inc: Clean/dry/intact w/ bandage in place  Ext: SCD's in place and functional, non-tender b/l, no edema        A/P: 38y Female s/p ex-lap, abd myomectomy, R ovarian cystectomy     Neuro: PO Analgesia PRN    CV: Hemodynamically stable.  Monitor VS. CBC in AM.   Pulm: Saturating well on room air.  Encourage OOB and incentive spirometer use.   GI: Advance to regular diet. Anti-emetics PRN.  : Black to gravity. DTV by 7p  FEN: Electrolytes: LR@125cc/hr.  Heme: DVT ppx w/ SCD's while in bed. Early ambulation, initially with assistance then as tolerated.    ID: Afebrile  Endo: No active issues   Dispo: Discharge from PACU when criteria met.       Fatmata Rubio PA-C

## 2024-04-25 NOTE — BRIEF OPERATIVE NOTE - NSICDXBRIEFPROCEDURE_GEN_ALL_CORE_FT
PROCEDURES:  Abdominal myomectomy 25-Apr-2024 12:46:07  Chilango Hurst  Open right ovarian cystectomy 25-Apr-2024 12:46:15  Chilango Hurst

## 2024-04-26 ENCOUNTER — TRANSCRIPTION ENCOUNTER (OUTPATIENT)
Age: 39
End: 2024-04-26

## 2024-04-26 LAB
HCT VFR BLD CALC: 28.4 % — LOW (ref 34.5–45)
HGB BLD-MCNC: 8.8 G/DL — LOW (ref 11.5–15.5)
MCHC RBC-ENTMCNC: 19.8 PG — LOW (ref 27–34)
MCHC RBC-ENTMCNC: 31 GM/DL — LOW (ref 32–36)
MCV RBC AUTO: 63.8 FL — LOW (ref 80–100)
NRBC # BLD: 0 /100 WBCS — SIGNIFICANT CHANGE UP (ref 0–0)
PLATELET # BLD AUTO: 343 K/UL — SIGNIFICANT CHANGE UP (ref 150–400)
RBC # BLD: 4.45 M/UL — SIGNIFICANT CHANGE UP (ref 3.8–5.2)
RBC # FLD: 18.8 % — HIGH (ref 10.3–14.5)
WBC # BLD: 14.98 K/UL — HIGH (ref 3.8–10.5)
WBC # FLD AUTO: 14.98 K/UL — HIGH (ref 3.8–10.5)

## 2024-04-26 RX ORDER — IBUPROFEN 200 MG
1 TABLET ORAL
Qty: 0 | Refills: 0 | DISCHARGE

## 2024-04-26 RX ORDER — ACETAMINOPHEN 500 MG
975 TABLET ORAL EVERY 6 HOURS
Refills: 0 | Status: DISCONTINUED | OUTPATIENT
Start: 2024-04-26 | End: 2024-04-29

## 2024-04-26 RX ORDER — ACETAMINOPHEN 500 MG
3 TABLET ORAL
Qty: 0 | Refills: 0 | DISCHARGE

## 2024-04-26 RX ORDER — ONDANSETRON 8 MG/1
4 TABLET, FILM COATED ORAL EVERY 6 HOURS
Refills: 0 | Status: DISCONTINUED | OUTPATIENT
Start: 2024-04-26 | End: 2024-04-29

## 2024-04-26 RX ORDER — SIMETHICONE 80 MG/1
80 TABLET, CHEWABLE ORAL EVERY 4 HOURS
Refills: 0 | Status: DISCONTINUED | OUTPATIENT
Start: 2024-04-26 | End: 2024-04-29

## 2024-04-26 RX ORDER — SODIUM CHLORIDE 9 MG/ML
3 INJECTION INTRAMUSCULAR; INTRAVENOUS; SUBCUTANEOUS EVERY 8 HOURS
Refills: 0 | Status: DISCONTINUED | OUTPATIENT
Start: 2024-04-26 | End: 2024-04-29

## 2024-04-26 RX ORDER — SENNA PLUS 8.6 MG/1
2 TABLET ORAL AT BEDTIME
Refills: 0 | Status: DISCONTINUED | OUTPATIENT
Start: 2024-04-26 | End: 2024-04-29

## 2024-04-26 RX ORDER — OXYCODONE HYDROCHLORIDE 5 MG/1
1 TABLET ORAL
Qty: 10 | Refills: 0
Start: 2024-04-26

## 2024-04-26 RX ADMIN — Medication 30 MILLIGRAM(S): at 12:15

## 2024-04-26 RX ADMIN — Medication 30 MILLIGRAM(S): at 18:04

## 2024-04-26 RX ADMIN — Medication 400 MILLIGRAM(S): at 09:12

## 2024-04-26 RX ADMIN — Medication 30 MILLIGRAM(S): at 00:00

## 2024-04-26 RX ADMIN — OXYCODONE HYDROCHLORIDE 5 MILLIGRAM(S): 5 TABLET ORAL at 16:37

## 2024-04-26 RX ADMIN — Medication 1000 MILLIGRAM(S): at 09:45

## 2024-04-26 RX ADMIN — Medication 30 MILLIGRAM(S): at 05:41

## 2024-04-26 RX ADMIN — Medication 400 MILLIGRAM(S): at 15:37

## 2024-04-26 RX ADMIN — SENNA PLUS 2 TABLET(S): 8.6 TABLET ORAL at 21:21

## 2024-04-26 RX ADMIN — Medication 975 MILLIGRAM(S): at 21:21

## 2024-04-26 RX ADMIN — ONDANSETRON 4 MILLIGRAM(S): 8 TABLET, FILM COATED ORAL at 21:21

## 2024-04-26 RX ADMIN — OXYCODONE HYDROCHLORIDE 5 MILLIGRAM(S): 5 TABLET ORAL at 15:42

## 2024-04-26 RX ADMIN — Medication 30 MILLIGRAM(S): at 23:59

## 2024-04-26 RX ADMIN — Medication 30 MILLIGRAM(S): at 05:25

## 2024-04-26 RX ADMIN — Medication 30 MILLIGRAM(S): at 18:40

## 2024-04-26 RX ADMIN — Medication 30 MILLIGRAM(S): at 13:00

## 2024-04-26 RX ADMIN — HEPARIN SODIUM 5000 UNIT(S): 5000 INJECTION INTRAVENOUS; SUBCUTANEOUS at 09:12

## 2024-04-26 RX ADMIN — Medication 1000 MILLIGRAM(S): at 03:09

## 2024-04-26 RX ADMIN — Medication 975 MILLIGRAM(S): at 21:50

## 2024-04-26 RX ADMIN — Medication 400 MILLIGRAM(S): at 02:42

## 2024-04-26 RX ADMIN — Medication 1000 MILLIGRAM(S): at 16:20

## 2024-04-26 RX ADMIN — HEPARIN SODIUM 5000 UNIT(S): 5000 INJECTION INTRAVENOUS; SUBCUTANEOUS at 21:21

## 2024-04-26 NOTE — DISCHARGE NOTE PROVIDER - NSDCCPCAREPLAN_GEN_ALL_CORE_FT
PRINCIPAL DISCHARGE DIAGNOSIS  Diagnosis: Uterine leiomyoma  Assessment and Plan of Treatment:       SECONDARY DISCHARGE DIAGNOSES  Diagnosis: Ovarian cyst  Assessment and Plan of Treatment:

## 2024-04-26 NOTE — CHART NOTE - NSCHARTNOTEFT_GEN_A_CORE
Pt evaluated due to     Pt seen and examined at bedside. Pt reports doing well. Has appropriate fluid intake. Has decreased appetite but ate salad 2 hours ago. Has not been walking around much.   She denies SOB/CP/palpitations, fever/chills, nausea/emesis.         Vital Signs Last 24 Hrs  T(C): 37 (26 Apr 2024 18:04), Max: 37.6 (26 Apr 2024 16:35)  T(F): 98.6 (26 Apr 2024 18:04), Max: 99.7 (26 Apr 2024 16:35)  HR: 105 (26 Apr 2024 18:04) (100 - 117)  BP: 121/79 (26 Apr 2024 18:04) (110/73 - 128/84)  BP(mean): --  RR: 18 (26 Apr 2024 18:04) (18 - 18)  SpO2: 100% (26 Apr 2024 18:04) (95% - 100%)    Parameters below as of 26 Apr 2024 18:04  Patient On (Oxygen Delivery Method): room air        04-25 @ 07:01 - 04-26 @ 07:00  --------------------------------------------------------  IN: 325 mL / OUT: 1030 mL / NET: -705 mL    04-26 @ 07:01  -  04-26 @ 18:12  --------------------------------------------------------  IN: 0 mL / OUT: 200 mL / NET: -200 mL      VITALS:  /79    T98.6  O2 98%    PHYSICAL EXAM:  Gen: NAD, A+O x 3  CV: RRR  Pulm: CTA BL  Abd: Soft, nontender,  nondistended, no rebound or guarding, +BS  Incision: Clean, dry and intact  Extremities: No swelling or calf tenderness    MEDICATIONS  (STANDING):  heparin   Injectable 5000 Unit(s) SubCutaneous every 12 hours  influenza   Vaccine 0.5 milliLiter(s) IntraMuscular once  ketorolac   Injectable 30 milliGRAM(s) IV Push every 6 hours  sodium chloride 0.9% lock flush 3 milliLiter(s) IV Push every 8 hours    MEDICATIONS  (PRN):  oxyCODONE    IR 5 milliGRAM(s) Oral every 6 hours PRN Severe Pain (7 - 10)      Labs, additional tests:             8.8    14.98 )-----------( 343      ( 04-26 @ 06:41 )             28.4         A/P: Patient POD#1 s/p abd myomectomy, R ovarian cystectomy. Pt with tachycardia, improving.    - Reassured by recent , normal PE, and lack of sx  - Pt asymptomatic, advised pt to contact medical team should she develop sx   - Advised OOB and SCDs when in bed  - Advised pt to increase PO intake as tolerated  - Will continue to monitor    Favian Larsen PGY1 Pt evaluated due to     Pt seen and examined at bedside. Pt reports doing well. Has appropriate fluid intake. Has decreased appetite but ate salad 2 hours ago. Has not been walking around much.   She denies SOB/CP/palpitations, fever/chills, nausea/emesis.         Vital Signs Last 24 Hrs  T(C): 37 (26 Apr 2024 18:04), Max: 37.6 (26 Apr 2024 16:35)  T(F): 98.6 (26 Apr 2024 18:04), Max: 99.7 (26 Apr 2024 16:35)  HR: 105 (26 Apr 2024 18:04) (100 - 117)  BP: 121/79 (26 Apr 2024 18:04) (110/73 - 128/84)  BP(mean): --  RR: 18 (26 Apr 2024 18:04) (18 - 18)  SpO2: 100% (26 Apr 2024 18:04) (95% - 100%)    Parameters below as of 26 Apr 2024 18:04  Patient On (Oxygen Delivery Method): room air        04-25 @ 07:01 - 04-26 @ 07:00  --------------------------------------------------------  IN: 325 mL / OUT: 1030 mL / NET: -705 mL    04-26 @ 07:01  -  04-26 @ 18:12  --------------------------------------------------------  IN: 0 mL / OUT: 200 mL / NET: -200 mL      VITALS:  /79    T98.6  O2 98%    PHYSICAL EXAM:  Gen: NAD, A+O x 3  CV: RRR  Pulm: CTA BL  Abd: Soft, nontender,  nondistended, no rebound or guarding, +BS  Incision: Clean, dry and intact  Extremities: No swelling or calf tenderness    MEDICATIONS  (STANDING):  heparin   Injectable 5000 Unit(s) SubCutaneous every 12 hours  influenza   Vaccine 0.5 milliLiter(s) IntraMuscular once  ketorolac   Injectable 30 milliGRAM(s) IV Push every 6 hours  sodium chloride 0.9% lock flush 3 milliLiter(s) IV Push every 8 hours    MEDICATIONS  (PRN):  oxyCODONE    IR 5 milliGRAM(s) Oral every 6 hours PRN Severe Pain (7 - 10)      Labs, additional tests:             8.8    14.98 )-----------( 343      ( 04-26 @ 06:41 )             28.4         A/P: Patient POD#1 s/p abd myomectomy, R ovarian cystectomy. Pt with tachycardia, improving.    - Reassured by recent , [id number], normal PE, and lack of sx  - Pt asymptomatic, advised pt to contact medical team should she develop sx   - Advised OOB and SCDs when in bed  - Advised pt to increase PO intake as tolerated  - Will continue to monitor    Favian Larsen PGY1

## 2024-04-26 NOTE — PROGRESS NOTE ADULT - SUBJECTIVE AND OBJECTIVE BOX
Day 1 of Anesthesia Pain Management Service    SUBJECTIVE: Doing ok  Pain Scale Score:          [X] Refer to charted pain scores    THERAPY:    s/p    150 mcg PF morphine on 4\25\2024       MEDICATIONS  (STANDING):  acetaminophen   IVPB .. 1000 milliGRAM(s) IV Intermittent every 6 hours  heparin   Injectable 5000 Unit(s) SubCutaneous every 12 hours  influenza   Vaccine 0.5 milliLiter(s) IntraMuscular once  ketorolac   Injectable 30 milliGRAM(s) IV Push every 6 hours  lactated ringers. 1000 milliLiter(s) (75 mL/Hr) IV Continuous <Continuous>  morphine PF Spinal 0.15 milliGRAM(s) IntraThecal. once    MEDICATIONS  (PRN):  nalbuphine Injectable 2.5 milliGRAM(s) IV Push every 6 hours PRN Pruritus  naloxone Injectable 0.1 milliGRAM(s) IV Push every 3 minutes PRN For ANY of the following changes in patient status:  A. RR LESS THAN 10 breaths per minute, B. Oxygen saturation LESS THAN 90%, C. Sedation score of 6  oxyCODONE    IR 5 milliGRAM(s) Oral every 6 hours PRN Severe Pain (7 - 10)      OBJECTIVE:    Sedation:        	[X] Alert	 [ ] Drowsy	[ ] Arousable      [ ] Asleep       [ ] Unresponsive    Side Effects:	[X] None 	[ ] Nausea	[ ] Vomiting         [ ] Pruritus  		[ ] Weakness            [ ] Numbness	          [ ] Other:    Vital Signs Last 24 Hrs  T(C): 37.1 (26 Apr 2024 08:55), Max: 37.5 (26 Apr 2024 05:16)  T(F): 98.8 (26 Apr 2024 08:55), Max: 99.5 (26 Apr 2024 05:16)  HR: 102 (26 Apr 2024 08:55) (75 - 102)  BP: 112/72 (26 Apr 2024 08:55) (91/50 - 113/76)  BP(mean): 87 (25 Apr 2024 16:12) (66 - 87)  RR: 18 (26 Apr 2024 08:55) (16 - 18)  SpO2: 95% (26 Apr 2024 08:55) (94% - 100%)    Parameters below as of 26 Apr 2024 08:55  Patient On (Oxygen Delivery Method): room air        ASSESSMENT/ PLAN  [X] Patient transitioned to prn analgesics  [X] Pain management per primary service, pain service to sign off   [X]Documentation and Verification of current medications

## 2024-04-26 NOTE — DISCHARGE NOTE PROVIDER - CARE PROVIDER_API CALL
Kishore Cotto  Obstetrics and Gynecology  [address] [place] [address], [facility] [address]  [place] [address]  Phone: [phone]  Fax: [phone]  Follow Up Time: 2 weeks

## 2024-04-26 NOTE — PROGRESS NOTE ADULT - SUBJECTIVE AND OBJECTIVE BOX
R4 GYN ONC Progress Note     Patient seen and examined at bedside.  No acute events overnight. No acute complaints.  Pain well controlled.  Patient is ambulating and tolerating regular diet.   Has not yet passed flatus.   Black is still in place.   Denies CP, SOB, N/V, fevers, and chills.    Vital Signs Last 24 Hours  T(C): 37.5 (04-26-24 @ 05:16), Max: 37.5 (04-26-24 @ 05:16)  HR: 102 (04-26-24 @ 05:16) (74 - 102)  BP: 110/73 (04-26-24 @ 05:16) (91/50 - 134/90)  RR: 18 (04-26-24 @ 05:16) (16 - 18)  SpO2: 100% (04-26-24 @ 05:16) (94% - 100%)    I&O's Summary    25 Apr 2024 07:01  -  26 Apr 2024 06:56  --------------------------------------------------------  IN: 325 mL / OUT: 1030 mL / NET: -705 mL        Physical Exam:  General: NAD  CV: RRR  Lungs: CTA b/l, good air flow b/l   Abdomen: Soft, mildly-tender to palpation diffusely, softly distended, normoactive bowel sounds  Incision:  C/D/I, dressing removed  Ext: No pain or swelling     Labs:      MEDICATIONS  (STANDING):  acetaminophen   IVPB .. 1000 milliGRAM(s) IV Intermittent every 6 hours  heparin   Injectable 5000 Unit(s) SubCutaneous every 12 hours  influenza   Vaccine 0.5 milliLiter(s) IntraMuscular once  ketorolac   Injectable 30 milliGRAM(s) IV Push every 6 hours  lactated ringers. 1000 milliLiter(s) (75 mL/Hr) IV Continuous <Continuous>  morphine PF Spinal 0.15 milliGRAM(s) IntraThecal. once    MEDICATIONS  (PRN):  nalbuphine Injectable 2.5 milliGRAM(s) IV Push every 6 hours PRN Pruritus  naloxone Injectable 0.1 milliGRAM(s) IV Push every 3 minutes PRN For ANY of the following changes in patient status:  A. RR LESS THAN 10 breaths per minute, B. Oxygen saturation LESS THAN 90%, C. Sedation score of 6  oxyCODONE    IR 5 milliGRAM(s) Oral every 6 hours PRN Severe Pain (7 - 10)       R4 GYN Progress Note     Patient seen and examined at bedside.  No acute events overnight. No acute complaints.  Pain well controlled.  Patient is ambulating and tolerating regular diet.   Has not yet passed flatus.   Black is still in place.   Denies CP, SOB, N/V, fevers, and chills.    Vital Signs Last 24 Hours  T(C): 37.5 (04-26-24 @ 05:16), Max: 37.5 (04-26-24 @ 05:16)  HR: 102 (04-26-24 @ 05:16) (74 - 102)  BP: 110/73 (04-26-24 @ 05:16) (91/50 - 134/90)  RR: 18 (04-26-24 @ 05:16) (16 - 18)  SpO2: 100% (04-26-24 @ 05:16) (94% - 100%)    I&O's Summary    25 Apr 2024 07:01  -  26 Apr 2024 06:56  --------------------------------------------------------  IN: 325 mL / OUT: 1030 mL / NET: -705 mL        Physical Exam:  General: NAD  CV: RRR  Lungs: CTA b/l, good air flow b/l   Abdomen: Soft, mildly-tender to palpation diffusely, softly distended, normoactive bowel sounds  Incision:  C/D/I, dressing removed  Ext: No pain or swelling     Labs:      MEDICATIONS  (STANDING):  acetaminophen   IVPB .. 1000 milliGRAM(s) IV Intermittent every 6 hours  heparin   Injectable 5000 Unit(s) SubCutaneous every 12 hours  influenza   Vaccine 0.5 milliLiter(s) IntraMuscular once  ketorolac   Injectable 30 milliGRAM(s) IV Push every 6 hours  lactated ringers. 1000 milliLiter(s) (75 mL/Hr) IV Continuous <Continuous>  morphine PF Spinal 0.15 milliGRAM(s) IntraThecal. once    MEDICATIONS  (PRN):  nalbuphine Injectable 2.5 milliGRAM(s) IV Push every 6 hours PRN Pruritus  naloxone Injectable 0.1 milliGRAM(s) IV Push every 3 minutes PRN For ANY of the following changes in patient status:  A. RR LESS THAN 10 breaths per minute, B. Oxygen saturation LESS THAN 90%, C. Sedation score of 6  oxyCODONE    IR 5 milliGRAM(s) Oral every 6 hours PRN Severe Pain (7 - 10)

## 2024-04-26 NOTE — PROGRESS NOTE ADULT - SUBJECTIVE AND OBJECTIVE BOX
Day 1 of Anesthesia Pain Management Service    SUBJECTIVE:  Pain Scale Score:          [X] Refer to charted pain scores    THERAPY: Received PF spinal morphine as above    OBJECTIVE:    Sedation:        	[X] Alert	[ ] Drowsy	[ ] Arousable      [ ] Asleep       [ ] Unresponsive    Side Effects:	[X] None	[ ] Nausea	[ ] Vomiting         [ ] Pruritus  		[ ] Weakness            [ ] Numbness	          [ ] Other:    ASSESSMENT/ PLAN  [X] Patient transitioned to prn analgesics  [X] Pain management per primary service, pain service to sign off   [X]Documentation and Verification of current medications    Pt starting to feel more incisional pain and cramping, recommended to continue non-opioid regimen with toradol and acetaminophen. Patient also made aware that PRN oxycodone is also available for her prior to increased physical activity such as sitting up and getting out of bed. Pt expresses understanding

## 2024-04-26 NOTE — DISCHARGE NOTE PROVIDER - NSDCHC_MEDRECSTATUS_GEN_ALL_CORE
[facility], Emergency Department    [address] [address]    Phone:  [phone]                                       Evangelista Gipson   MRN: [id number]    Department:  [facility], Emergency Department   Date of Visit:  7/10/2017           Patient Information     Date Of Birth          1956        Your diagnoses for this visit were:     Vertigo        You were seen by Guevara Hawkins MD.        Discharge Instructions       Please make an appointment to follow up with Your Primary Care Provider in 5-7 days for recheck.    Return to the ER for any worsening, fevers, difficulty speaking, or unilateral weakness.    Discharge References/Attachments     VERTIGO, UNSPECIFIED (ENGLISH)      Future Appointments        Provider Department Dept Phone Center    7/25/2017 1:00 PM Yanet Vee MD [facility] Endocrinology [phone] [facility]    8/1/2017 12:30 PM Landry Sandoval MD [facility] [phone]  SLEEP    8/7/2017 1:00 PM Lab [facility] Lab [phone] [facility]    8/7/2017 1:30 PM UC CV DEVICE 1              se [facility] [phone] [facility]    8/7/2017 2:00 PM Dawit Pastor MD [facility] [phone] [facility]    9/13/2017 10:30 AM Naida Dodson MD [facility] and Infectious Diseases [phone] [facility]      24 Hour Appointment Hotline       To make an appointment at any [facility], call [phone] ([phone]). If you don't have a family doctor or clinic, we will help you find one. [facility] clinics are conveniently located to serve the needs of you and your family.             Review of your medicines      START taking        Dose / Directions Last dose taken    meclizine 12.5 MG tablet   Commonly known as:  ANTIVERT   Dose:  25 mg   Quantity:  21 tablet        Take 2 tablets (25 mg) by mouth 3 times daily   Refills:  0          Our records show that you are taking the medicines listed below. If these are  incorrect, please call your family doctor or clinic.        Dose / Directions Last dose taken    allopurinol 100 MG tablet   Commonly known as:  ZYLOPRIM   Dose:  50 mg   Quantity:  45 tablet        Take 0.5 tablets (50 mg) by mouth daily   Refills:  3        amoxicillin 500 MG capsule   Commonly known as:  AMOXIL   Quantity:  4 capsule        Take 4 capsules (2000mg) 1hr prior to dental cleaning or procedure   Refills:  3        aspirin 81 MG tablet   Dose:  81 mg        Take 81 mg by mouth daily   Refills:  0        atorvastatin 80 MG tablet   Commonly known as:  LIPITOR   Dose:  80 mg   Quantity:  90 tablet        Take 1 tablet (80 mg) by mouth daily   Refills:  4        bumetanide 1 MG tablet   Commonly known as:  BUMEX   Quantity:  270 tablet        Take 2mg in the morning and 1mg in the afternoon   Refills:  3        docusate sodium 100 MG tablet   Commonly known as:  COLACE   Dose:  100 mg        Take 100 mg by mouth 2 times daily   Refills:  0        fluconazole 200 MG tablet   Commonly known as:  DIFLUCAN   Quantity:  45 tablet        Take one-half tablet by  mouth daily   Refills:  1        * insulin aspart 100 UNIT/ML injection   Commonly known as:  NovoLOG PEN   Dose:  1-7 Units        Inject 1-7 Units Subcutaneous 3 times daily (before meals)   Refills:  0        * insulin aspart 100 UNIT/ML injection   Commonly known as:  NovoLOG PEN   Dose:  1-5 Units        Inject 1-5 Units Subcutaneous At Bedtime   Refills:  0        insulin glargine 100 UNIT/ML injection   Commonly known as:  LANTUS   Dose:  50 Units        Inject 50 Units Subcutaneous At Bedtime   Refills:  0        lisinopril 10 MG tablet   Commonly known as:  PRINIVIL/ZESTRIL   Dose:  10 mg   Quantity:  90 tablet        Take 1 tablet (10 mg) by mouth daily   Refills:  3        Magnesium 400 MG Caps   Dose:  400 mg        Take 400 mg by mouth 2 times daily   Refills:  0        Medical Compression Stockings Misc   Dose:  1 Box   Quantity:  1 each         1 Box daily 20-30mmHG Graduated compression stockings Wear daily while upright.  May remove at night.   Refills:  1        metoprolol 25 MG 24 hr tablet   Commonly known as:  TOPROL-XL   Dose:  37.5 mg   Quantity:  135 tablet        Take 1.5 tablets (37.5 mg) by mouth At Bedtime   Refills:  3        mexiletine 150 MG capsule   Commonly known as:  MEXITIL   Quantity:  180 capsule        Take 1 capsule by mouth two times daily   Refills:  1        multivitamin, therapeutic with minerals Tabs tablet   Dose:  1 tablet   Quantity:  30 each        Take 1 tablet by mouth daily   Refills:  0        nitroGLYcerin 0.4 MG sublingual tablet   Commonly known as:  NITROSTAT        Place under the tongue every 5 minutes as needed for chest pain Reported on 4/18/2017   Refills:  0        order for [facility]        GOODWIN Dream Station Auto CPAP 10 cm, Airfit F20 FFM.   Refills:  0        potassium chloride SA 20 MEQ CR tablet   Commonly known as:  K-DUR/KLOR-CON M   Dose:  60 mEq   Quantity:  540 tablet        Take 3 tablets (60 mEq) by mouth 2 times daily   Refills:  3        RANEXA 500 MG 12 hr tablet   Quantity:  180 tablet   Generic drug:  ranolazine        Take 1 tablet (500 mg) by  mouth 2 times daily   Refills:  3        sertraline 50 MG tablet   Commonly known as:  ZOLOFT   Dose:  50 mg   Quantity:  90 tablet        Take 1 tablet (50 mg) by mouth every morning   Refills:  3        warfarin 1 MG tablet   Commonly known as:  COUMADIN   Quantity:  150 tablet        TAKE 1.5MG ON TU,TH,SAT,SUN , AND 2MG ON M,W,F, OR AS  DIRECTED BY THE MEDICATION  MONITORING CLINIC AT THE [facility]   Refills:  3        zolpidem 5 MG tablet   Commonly known as:  AMBIEN   Quantity:  1 tablet        Take tablet by mouth 15 minutes prior to sleep, for Sleep Study   Refills:  0        * Notice:  This list has 2 medication(s) that are the same as other medications prescribed for you. Read the directions carefully, and ask your doctor or other  care provider to review them with you.            Prescriptions were sent or printed at these locations (1 Prescription)                   Other Prescriptions                Printed at Department/Unit printer (1 of 1)         meclizine (ANTIVERT) 12.5 MG tablet                Procedures and tests performed during your visit     CBC with platelets differential    CT Head w/o Contrast    Comprehensive metabolic panel    Glucose monitor nursing POCT    INR    Peripheral IV catheter      Orders Needing Specimen Collection     None      Pending Results     No orders found from 7/8/2017 to 7/11/2017.            Pending Culture Results     No orders found from 7/8/2017 to 7/11/2017.            Pending Results Instructions     If you had any lab results that were not finalized at the time of your Discharge, you can call the ED Lab Result RN at [phone]. You will be contacted by this team for any positive Lab results or changes in treatment. The nurses are available 7 days a week from 10A to 6:30P.  You can leave a message 24 hours per day and they will return your call.        Thank you for choosing [facility]       Thank you for choosing [facility] for your care. Our goal is always to provide you with excellent care. Hearing back from our patients is one way we can continue to improve our services. Please take a few minutes to complete the written survey that you may receive in the mail after you visit with us. Thank you!        [facility] Information     [facility] gives you secure access to your electronic health record. If you see a primary care provider, you can also send messages to your care team and make appointments. If you have questions, please call your primary care clinic.  If you do not have a primary care provider, please call [phone] and they will assist you.        Care EveryWhere ID     This is your Care EveryWhere ID. This could be used by other organizations to access your [facility]  records  [id number]        Equal Access to Services     APRIL LOZANO : Sarika Ahn, roberta jones, clay bello. So [facility] [phone].    ATENCIÓN: Si habla español, tiene a gonsalves disposición servicios gratuitos de asistencia lingüística. Llame al [phone].    We comply with applicable federal civil rights laws and [place] laws. We do not discriminate on the basis of race, color, national origin, age, disability sex, sexual orientation or gender identity.            After Visit Summary       This is your record. Keep this with you and show to your community pharmacist(s) and doctor(s) at your next visit.                   Admission Reconciliation is Completed  Discharge Reconciliation is Not Complete Admission Reconciliation is Completed  Discharge Reconciliation is Completed

## 2024-04-26 NOTE — DISCHARGE NOTE PROVIDER - NSDCMRMEDTOKEN_GEN_ALL_CORE_FT
acetaminophen 325 mg oral tablet: 3 tab(s) orally every 6 hours as needed for  moderate pain  ibuprofen 600 mg oral tablet: 1 tab(s) orally every 6 hours as needed for  moderate pain  Multiple Vitamins oral tablet: 1 tab(s) orally once a day   acetaminophen 325 mg oral tablet: 3 tab(s) orally every 6 hours as needed for  moderate pain  ibuprofen 600 mg oral tablet: 1 tab(s) orally every 6 hours as needed for  moderate pain  Multiple Vitamins oral tablet: 1 tab(s) orally once a day  oxyCODONE 5 mg oral tablet: 1 tab(s) orally every 6 hours as needed for  severe pain MDD: 4

## 2024-04-26 NOTE — DISCHARGE NOTE PROVIDER - NSDCCPTREATMENT_GEN_ALL_CORE_FT
PRINCIPAL PROCEDURE  Procedure: Abdominal myomectomy  Findings and Treatment:       SECONDARY PROCEDURE  Procedure: Open right ovarian cystectomy  Findings and Treatment:

## 2024-04-26 NOTE — PROGRESS NOTE ADULT - ASSESSMENT
A/P: 38y POD#1   s/p abd myomectomy, R cystectomy. .    Patient is doing well postoperatively and beginning to meeting postop milestones.     Neuro: pain well controlled on current regimen.   CV: Hemodynamically stable. f/u H/H stable on AM labs.   Pulm: Saturating well on room air, encourage oob/amb  GI: Continue regular diet  : UOP adequate, d/c bishop pending AM labs.  Heme: HSQ and SCDs for DVT ppx  FEN: LR@75.  replete electrolytes prn   ID: Afebrile  Endo: No active issues   Dispo: Continue routine post-op care    Natali PGY4 A/P: 38y POD#1   s/p abd myomectomy, R cystectomy. .    Patient is doing well postoperatively and beginning to meeting postop milestones.     Neuro: pain well controlled on current regimen. will transition to PO pain meds today.   CV: Hemodynamically stable. f/u H/H stable on AM labs.   Pulm: Saturating well on room air, encourage oob/amb  GI: Continue regular diet  : UOP adequate, d/c bishop pending AM labs.  Heme: HSQ and SCDs for DVT ppx  FEN: LR@75.  replete electrolytes prn   ID: Afebrile  Endo: No active issues   Dispo: Continue routine post-op care    Natali PGY4

## 2024-04-26 NOTE — DISCHARGE NOTE PROVIDER - HOSPITAL COURSE
39yo s/p ex-lap, abdominal myomectomy, and right ovarian cystectomy.     Patient presented for scheduled procedure. She underwent uncomplicated ex-lap, abdominal myomectomy, and right ovarian cystectomy. Please see operative note for full details. Patient was transferred to recovery room in stable condition.  EBL: 150cc. Hct:36.6->28.4     On POD #0, Patient's diet was advanced and she tolerated regular diet without issues.  On POD#1, Black catheter was discontinued and patient voided without issues. Labs drawn post-operatively and on POD#1 were stable compared to pre-op.         POD#2 routine post-operative care was performed.  No notable events.   On POD#3 patient was deemed stable for discharge as she was meeting postoperative milestones - tolerating regular diet, ambulating without difficulty, voiding, and pain was well controlled on oral medications. Throughout admission, patient received prophylactic anticoagulation.  Patient was instructed to follow up with Dr. Cotto in 2 weeks. 37yo s/p ex-lap, abdominal myomectomy, and right ovarian cystectomy.     Patient presented for scheduled procedure. She underwent uncomplicated ex-lap, abdominal myomectomy, and right ovarian cystectomy. Please see operative note for full details. Patient was transferred to recovery room in stable condition.  EBL: 150cc. Hct:36.6->28.4     On POD #0, Patient's diet was advanced and she tolerated regular diet without issues.  On POD#1, Black catheter was discontinued and patient voided without issues. Labs drawn post-operatively and on POD#1 were stable compared to pre-op.         POD#2 routine post-operative care was performed.  No notable events.   On POD#4 patient was deemed stable for discharge as she was meeting postoperative milestones - tolerating regular diet, ambulating without difficulty, voiding, and pain was well controlled on oral medications. Throughout admission, patient received prophylactic anticoagulation.  Patient was instructed to follow up with Dr. Cotto in 2 weeks.

## 2024-04-27 LAB
ANISOCYTOSIS BLD QL: SIGNIFICANT CHANGE UP
BASOPHILS # BLD AUTO: 0.16 K/UL — SIGNIFICANT CHANGE UP (ref 0–0.2)
BASOPHILS NFR BLD AUTO: 0.9 % — SIGNIFICANT CHANGE UP (ref 0–2)
BURR CELLS BLD QL SMEAR: PRESENT — SIGNIFICANT CHANGE UP
ELLIPTOCYTES BLD QL SMEAR: SLIGHT — SIGNIFICANT CHANGE UP
EOSINOPHIL # BLD AUTO: 0 K/UL — SIGNIFICANT CHANGE UP (ref 0–0.5)
EOSINOPHIL NFR BLD AUTO: 0 % — SIGNIFICANT CHANGE UP (ref 0–6)
HCT VFR BLD CALC: 27.1 % — LOW (ref 34.5–45)
HCT VFR BLD CALC: 28.4 % — LOW (ref 34.5–45)
HGB BLD-MCNC: 8.6 G/DL — LOW (ref 11.5–15.5)
HGB BLD-MCNC: 8.7 G/DL — LOW (ref 11.5–15.5)
HYPOCHROMIA BLD QL: SLIGHT — SIGNIFICANT CHANGE UP
LYMPHOCYTES # BLD AUTO: 1.68 K/UL — SIGNIFICANT CHANGE UP (ref 1–3.3)
LYMPHOCYTES # BLD AUTO: 9.6 % — LOW (ref 13–44)
MANUAL SMEAR VERIFICATION: SIGNIFICANT CHANGE UP
MCHC RBC-ENTMCNC: 19.5 PG — LOW (ref 27–34)
MCHC RBC-ENTMCNC: 20.1 PG — LOW (ref 27–34)
MCHC RBC-ENTMCNC: 30.6 GM/DL — LOW (ref 32–36)
MCHC RBC-ENTMCNC: 31.7 GM/DL — LOW (ref 32–36)
MCV RBC AUTO: 63.5 FL — LOW (ref 80–100)
MCV RBC AUTO: 63.5 FL — LOW (ref 80–100)
MICROCYTES BLD QL: SLIGHT — SIGNIFICANT CHANGE UP
MONOCYTES # BLD AUTO: 0.3 K/UL — SIGNIFICANT CHANGE UP (ref 0–0.9)
MONOCYTES NFR BLD AUTO: 1.7 % — LOW (ref 2–14)
NEUTROPHILS # BLD AUTO: 15.37 K/UL — HIGH (ref 1.8–7.4)
NEUTROPHILS NFR BLD AUTO: 87.8 % — HIGH (ref 43–77)
NRBC # BLD: 0 /100 WBCS — SIGNIFICANT CHANGE UP (ref 0–0)
OVALOCYTES BLD QL SMEAR: SLIGHT — SIGNIFICANT CHANGE UP
PLAT MORPH BLD: NORMAL — SIGNIFICANT CHANGE UP
PLATELET # BLD AUTO: 322 K/UL — SIGNIFICANT CHANGE UP (ref 150–400)
PLATELET # BLD AUTO: 339 K/UL — SIGNIFICANT CHANGE UP (ref 150–400)
POIKILOCYTOSIS BLD QL AUTO: SIGNIFICANT CHANGE UP
RBC # BLD: 4.27 M/UL — SIGNIFICANT CHANGE UP (ref 3.8–5.2)
RBC # BLD: 4.47 M/UL — SIGNIFICANT CHANGE UP (ref 3.8–5.2)
RBC # FLD: 18.7 % — HIGH (ref 10.3–14.5)
RBC # FLD: 18.8 % — HIGH (ref 10.3–14.5)
RBC BLD AUTO: ABNORMAL
SCHISTOCYTES BLD QL AUTO: SLIGHT — SIGNIFICANT CHANGE UP
TARGETS BLD QL SMEAR: SLIGHT — SIGNIFICANT CHANGE UP
WBC # BLD: 17.51 K/UL — HIGH (ref 3.8–10.5)
WBC # BLD: 19.18 K/UL — HIGH (ref 3.8–10.5)
WBC # FLD AUTO: 17.51 K/UL — HIGH (ref 3.8–10.5)
WBC # FLD AUTO: 19.18 K/UL — HIGH (ref 3.8–10.5)

## 2024-04-27 PROCEDURE — 93010 ELECTROCARDIOGRAM REPORT: CPT

## 2024-04-27 RX ORDER — KETOROLAC TROMETHAMINE 30 MG/ML
15 SYRINGE (ML) INJECTION EVERY 6 HOURS
Refills: 0 | Status: DISCONTINUED | OUTPATIENT
Start: 2024-04-27 | End: 2024-04-27

## 2024-04-27 RX ORDER — IBUPROFEN 200 MG
600 TABLET ORAL EVERY 6 HOURS
Refills: 0 | Status: DISCONTINUED | OUTPATIENT
Start: 2024-04-27 | End: 2024-04-27

## 2024-04-27 RX ORDER — IBUPROFEN 200 MG
600 TABLET ORAL EVERY 6 HOURS
Refills: 0 | Status: DISCONTINUED | OUTPATIENT
Start: 2024-04-27 | End: 2024-04-29

## 2024-04-27 RX ADMIN — SENNA PLUS 2 TABLET(S): 8.6 TABLET ORAL at 21:12

## 2024-04-27 RX ADMIN — Medication 975 MILLIGRAM(S): at 03:10

## 2024-04-27 RX ADMIN — Medication 30 MILLIGRAM(S): at 05:55

## 2024-04-27 RX ADMIN — Medication 975 MILLIGRAM(S): at 03:41

## 2024-04-27 RX ADMIN — Medication 15 MILLIGRAM(S): at 16:27

## 2024-04-27 RX ADMIN — Medication 600 MILLIGRAM(S): at 21:12

## 2024-04-27 RX ADMIN — Medication 30 MILLIGRAM(S): at 00:30

## 2024-04-27 RX ADMIN — Medication 15 MILLIGRAM(S): at 17:01

## 2024-04-27 RX ADMIN — SODIUM CHLORIDE 3 MILLILITER(S): 9 INJECTION INTRAMUSCULAR; INTRAVENOUS; SUBCUTANEOUS at 05:45

## 2024-04-27 RX ADMIN — Medication 30 MILLIGRAM(S): at 06:30

## 2024-04-27 RX ADMIN — Medication 600 MILLIGRAM(S): at 21:29

## 2024-04-27 RX ADMIN — HEPARIN SODIUM 5000 UNIT(S): 5000 INJECTION INTRAVENOUS; SUBCUTANEOUS at 21:12

## 2024-04-27 RX ADMIN — SODIUM CHLORIDE 3 MILLILITER(S): 9 INJECTION INTRAMUSCULAR; INTRAVENOUS; SUBCUTANEOUS at 14:17

## 2024-04-27 NOTE — PROGRESS NOTE ADULT - SUBJECTIVE AND OBJECTIVE BOX
Subjective:   Pt seen and examined at bedside. No events overnight. Pain well controlled. Patient ambulating. Passing flatus. Tolerating regular diet. Pt denies fever, chills, chest pain, SOB, nausea, vomiting, lightheadedness, dizziness.      Objective:    MEDICATIONS  (STANDING):  heparin   Injectable 5000 Unit(s) SubCutaneous every 12 hours  influenza   Vaccine 0.5 milliLiter(s) IntraMuscular once  ketorolac   Injectable 30 milliGRAM(s) IV Push every 6 hours  senna 2 Tablet(s) Oral at bedtime  sodium chloride 0.9% lock flush 3 milliLiter(s) IV Push every 8 hours    MEDICATIONS  (PRN):  acetaminophen     Tablet .. 975 milliGRAM(s) Oral every 6 hours PRN Mild Pain (1 - 3)  ondansetron Injectable 4 milliGRAM(s) IV Push every 6 hours PRN Nausea and/or Vomiting  oxyCODONE    IR 5 milliGRAM(s) Oral every 6 hours PRN Severe Pain (7 - 10)  simethicone 80 milliGRAM(s) Chew every 4 hours PRN Gas      T(F): 98.3 (04-27-24 @ 06:21), Max: 99.7 (04-26-24 @ 16:35)  HR: 104 (04-27-24 @ 06:21) (102 - 117)  BP: 129/85 (04-27-24 @ 06:21) (112/72 - 129/85)  RR: 18 (04-27-24 @ 06:21) (18 - 18)  SpO2: 98% (04-27-24 @ 06:21) (95% - 100%)  Wt(kg): --    Physical Exam:  Constitutional: NAD, A+O x3  CV: RRR  Lungs: Clear to auscultation bilaterally  Abdomen: Soft, nondistended, no guarding or rebound tenderness. Normal bowel sounds  Incision: Clean, dry, intact  : No bleeding on pad  Extremities: No lower extremity edema or calf tenderness bilaterally; venodynes in place    LABS:                CAPILLARY BLOOD GLUCOSE          I&O's Summary    26 Apr 2024 07:01  -  27 Apr 2024 07:00  --------------------------------------------------------  IN: 0 mL / OUT: 800 mL / NET: -800 mL

## 2024-04-27 NOTE — PROGRESS NOTE ADULT - ASSESSMENT
A/P: 38y POD#1   s/p abd myomectomy, R cystectomy. .    Patient is doing well postoperatively and beginning to meeting postop milestones.     Neuro: PO pain med  CV: Hemodynamically stable.   -Hct: 36.6(PST)->28.4 f/u AM CBC  Pulm: Saturating well on room air, encourage oob/amb  GI: Continue regular diet  : voiding spontaneously   Heme: HSQ and SCDs for DVT ppx  FEN: SLIV   ID: Afebrile  Endo: No active issues   Dispo: Continue routine post-op care    Lillie PGY2

## 2024-04-27 NOTE — CHART NOTE - NSCHARTNOTEFT_GEN_A_CORE
37 yo POD#2 s/p abdominal myomectomy. MD called to bedside 2/2 persistent tachycardia noted in the PM. On OB evaluation, patient denies chest pain, shortness of breath. Patient endorses normal urinary habits and denies any lightheadedness of dizziness when going to the bathroom.     Vital Signs Last 24 Hrs  T(C): 36.9 (27 Apr 2024 13:33), Max: 37.6 (26 Apr 2024 16:35)  T(F): 98.4 (27 Apr 2024 13:33), Max: 99.7 (26 Apr 2024 16:35)  HR: 114 (27 Apr 2024 13:33) (104 - 117)  BP: 140/89 (27 Apr 2024 13:33) (121/79 - 140/89)  BP(mean): --  RR: 18 (27 Apr 2024 13:33) (18 - 18)  SpO2: 98% (27 Apr 2024 13:33) (95% - 100%)    Parameters below as of 27 Apr 2024 13:33  Patient On (Oxygen Delivery Method): room air    Gen: NAD  CV: Tachycardic  Lungs: Breathing comfortably on RA  Abd: Soft, appropriately tender  Incision: Pfannenstiel c/d/i    37 yo POD#2 s/p abdominal myomectomy post op c/b tachycardia this PM. Patient asymptomatic, satting 98% on RA    -EKG  -STAT labs obtained  -Will continue to monitor closely    discussed w Dr Kirti Lance PGY3

## 2024-04-28 LAB
HCT VFR BLD CALC: 27.9 % — LOW (ref 34.5–45)
HGB BLD-MCNC: 8.6 G/DL — LOW (ref 11.5–15.5)
MCHC RBC-ENTMCNC: 19.5 PG — LOW (ref 27–34)
MCHC RBC-ENTMCNC: 30.8 GM/DL — LOW (ref 32–36)
MCV RBC AUTO: 63.1 FL — LOW (ref 80–100)
NRBC # BLD: 0 /100 WBCS — SIGNIFICANT CHANGE UP (ref 0–0)
PLATELET # BLD AUTO: 318 K/UL — SIGNIFICANT CHANGE UP (ref 150–400)
RBC # BLD: 4.42 M/UL — SIGNIFICANT CHANGE UP (ref 3.8–5.2)
RBC # FLD: 18.6 % — HIGH (ref 10.3–14.5)
WBC # BLD: 13.46 K/UL — HIGH (ref 3.8–10.5)
WBC # FLD AUTO: 13.46 K/UL — HIGH (ref 3.8–10.5)

## 2024-04-28 RX ADMIN — SENNA PLUS 2 TABLET(S): 8.6 TABLET ORAL at 21:23

## 2024-04-28 RX ADMIN — Medication 975 MILLIGRAM(S): at 10:15

## 2024-04-28 RX ADMIN — OXYCODONE HYDROCHLORIDE 5 MILLIGRAM(S): 5 TABLET ORAL at 16:15

## 2024-04-28 RX ADMIN — Medication 975 MILLIGRAM(S): at 15:30

## 2024-04-28 RX ADMIN — Medication 975 MILLIGRAM(S): at 21:24

## 2024-04-28 RX ADMIN — OXYCODONE HYDROCHLORIDE 5 MILLIGRAM(S): 5 TABLET ORAL at 15:30

## 2024-04-28 RX ADMIN — HEPARIN SODIUM 5000 UNIT(S): 5000 INJECTION INTRAVENOUS; SUBCUTANEOUS at 09:39

## 2024-04-28 RX ADMIN — Medication 600 MILLIGRAM(S): at 18:20

## 2024-04-28 RX ADMIN — OXYCODONE HYDROCHLORIDE 5 MILLIGRAM(S): 5 TABLET ORAL at 09:39

## 2024-04-28 RX ADMIN — OXYCODONE HYDROCHLORIDE 5 MILLIGRAM(S): 5 TABLET ORAL at 10:15

## 2024-04-28 RX ADMIN — Medication 600 MILLIGRAM(S): at 13:00

## 2024-04-28 RX ADMIN — Medication 600 MILLIGRAM(S): at 12:20

## 2024-04-28 RX ADMIN — SODIUM CHLORIDE 3 MILLILITER(S): 9 INJECTION INTRAMUSCULAR; INTRAVENOUS; SUBCUTANEOUS at 00:30

## 2024-04-28 RX ADMIN — HEPARIN SODIUM 5000 UNIT(S): 5000 INJECTION INTRAVENOUS; SUBCUTANEOUS at 21:27

## 2024-04-28 RX ADMIN — Medication 600 MILLIGRAM(S): at 04:31

## 2024-04-28 RX ADMIN — Medication 975 MILLIGRAM(S): at 16:15

## 2024-04-28 RX ADMIN — Medication 975 MILLIGRAM(S): at 09:38

## 2024-04-28 RX ADMIN — OXYCODONE HYDROCHLORIDE 5 MILLIGRAM(S): 5 TABLET ORAL at 22:24

## 2024-04-28 RX ADMIN — Medication 975 MILLIGRAM(S): at 22:24

## 2024-04-28 RX ADMIN — SODIUM CHLORIDE 3 MILLILITER(S): 9 INJECTION INTRAMUSCULAR; INTRAVENOUS; SUBCUTANEOUS at 22:24

## 2024-04-28 RX ADMIN — OXYCODONE HYDROCHLORIDE 5 MILLIGRAM(S): 5 TABLET ORAL at 21:24

## 2024-04-28 NOTE — PROGRESS NOTE ADULT - ASSESSMENT
A/P: 38y POD#1   s/p abd myomectomy, R cystectomy. .  POD 2 complicated by asymptomatic tachycardia with normal repeat CBC and EKG showing sinus tachycardia. Patient denies chest pain and SOB. Patient having increased incisional sensitivity and will not let providers touch sensitive area.    Neuro: PO pain med  CV: Hemodynamically stable.   -Hct: 36.6(PST)->28.4->28.4 f/u AM CBC  Pulm: Saturating well on room air, encourage oob/amb  GI: Continue regular diet  : voiding spontaneously   Heme: HSQ and SCDs for DVT ppx  FEN: SLIV   ID: Afebrile  WBC: 14->19->17 f/u AM CBC  Endo: No active issues   Dispo: Continue routine post-op care    Lillie PGY2   A/P: 38y POD#2  s/p abd myomectomy, R cystectomy. .  POD 2 complicated by asymptomatic tachycardia with normal repeat CBC and EKG showing sinus tachycardia. Patient denies chest pain and SOB. Patient having increased incisional sensitivity and will not let providers touch sensitive area.    Neuro: PO pain med  CV: Hemodynamically stable.   -Hct: 36.6(PST)->28.4->28.4 f/u AM CBC  Pulm: Saturating well on room air, encourage oob/amb  GI: Continue regular diet  : voiding spontaneously   Heme: HSQ and SCDs for DVT ppx  FEN: SLIV   ID: Afebrile  WBC: 14->19->17 f/u AM CBC  Endo: No active issues   Dispo: Continue routine post-op care    Lillie PGY2

## 2024-04-28 NOTE — PROGRESS NOTE ADULT - SUBJECTIVE AND OBJECTIVE BOX
Subjective:   Pt seen and examined at bedside. No events overnight.Patient ambulating. Passing flatus. Tolerating regular diet. Pt denies fever, chills, chest pain, SOB, nausea, vomiting, lightheadedness, dizziness.  States she is still having same degree of incisional tenderness but can walk around without issue. She was evaluated yesterday for tachycardia without chest pain and SOB. EKG and repeat CBC were wnl. Today she does not endorse symptoms of anemia and is not having any chest pain or SOB.     Objective:    MEDICATIONS  (STANDING):  heparin   Injectable 5000 Unit(s) SubCutaneous every 12 hours  ibuprofen  Tablet. 600 milliGRAM(s) Oral every 6 hours  influenza   Vaccine 0.5 milliLiter(s) IntraMuscular once  senna 2 Tablet(s) Oral at bedtime  sodium chloride 0.9% lock flush 3 milliLiter(s) IV Push every 8 hours    MEDICATIONS  (PRN):  acetaminophen     Tablet .. 975 milliGRAM(s) Oral every 6 hours PRN Mild Pain (1 - 3)  ondansetron Injectable 4 milliGRAM(s) IV Push every 6 hours PRN Nausea and/or Vomiting  oxyCODONE    IR 5 milliGRAM(s) Oral every 6 hours PRN Severe Pain (7 - 10)  simethicone 80 milliGRAM(s) Chew every 4 hours PRN Gas      T(F): 98.1 (04-28-24 @ 05:36), Max: 99.5 (04-27-24 @ 20:28)  HR: 96 (04-28-24 @ 05:36) (96 - 120)  BP: 145/94 (04-28-24 @ 05:36) (129/87 - 149/90)  RR: 18 (04-28-24 @ 05:36) (18 - 18)  SpO2: 100% (04-28-24 @ 05:36) (95% - 100%)  Wt(kg): --    Physical Exam:  Constitutional: NAD, A+O x3  CV: RRR  Lungs: Clear to auscultation bilaterally  Abdomen: Soft, nondistended, patient will not let provider touch incision 2/2 perceived sensitivity to the area.   Incision: Clean, dry, intact  : No bleeding on pad  Extremities: No lower extremity edema or calf tenderness bilaterally; venodynes in place    LABS:                CAPILLARY BLOOD GLUCOSE          I&O's Summary

## 2024-04-29 ENCOUNTER — TRANSCRIPTION ENCOUNTER (OUTPATIENT)
Age: 39
End: 2024-04-29

## 2024-04-29 VITALS
DIASTOLIC BLOOD PRESSURE: 89 MMHG | OXYGEN SATURATION: 99 % | RESPIRATION RATE: 18 BRPM | HEART RATE: 91 BPM | SYSTOLIC BLOOD PRESSURE: 140 MMHG | TEMPERATURE: 98 F

## 2024-04-29 LAB
HCT VFR BLD CALC: 27.2 % — LOW (ref 34.5–45)
HGB BLD-MCNC: 8.5 G/DL — LOW (ref 11.5–15.5)
MCHC RBC-ENTMCNC: 19.4 PG — LOW (ref 27–34)
MCHC RBC-ENTMCNC: 31.3 GM/DL — LOW (ref 32–36)
MCV RBC AUTO: 62.1 FL — LOW (ref 80–100)
NRBC # BLD: 0 /100 WBCS — SIGNIFICANT CHANGE UP (ref 0–0)
PLATELET # BLD AUTO: 323 K/UL — SIGNIFICANT CHANGE UP (ref 150–400)
RBC # BLD: 4.38 M/UL — SIGNIFICANT CHANGE UP (ref 3.8–5.2)
RBC # FLD: 18.6 % — HIGH (ref 10.3–14.5)
WBC # BLD: 8.1 K/UL — SIGNIFICANT CHANGE UP (ref 3.8–10.5)
WBC # FLD AUTO: 8.1 K/UL — SIGNIFICANT CHANGE UP (ref 3.8–10.5)

## 2024-04-29 PROCEDURE — 81025 URINE PREGNANCY TEST: CPT

## 2024-04-29 PROCEDURE — C1765: CPT

## 2024-04-29 PROCEDURE — 86901 BLOOD TYPING SEROLOGIC RH(D): CPT

## 2024-04-29 PROCEDURE — 93005 ELECTROCARDIOGRAM TRACING: CPT

## 2024-04-29 PROCEDURE — C9399: CPT

## 2024-04-29 PROCEDURE — 86850 RBC ANTIBODY SCREEN: CPT

## 2024-04-29 PROCEDURE — 85025 COMPLETE CBC W/AUTO DIFF WBC: CPT

## 2024-04-29 PROCEDURE — 86900 BLOOD TYPING SEROLOGIC ABO: CPT

## 2024-04-29 PROCEDURE — 36415 COLL VENOUS BLD VENIPUNCTURE: CPT

## 2024-04-29 PROCEDURE — C1889: CPT

## 2024-04-29 PROCEDURE — 85027 COMPLETE CBC AUTOMATED: CPT

## 2024-04-29 PROCEDURE — 88305 TISSUE EXAM BY PATHOLOGIST: CPT

## 2024-04-29 RX ORDER — OXYCODONE HYDROCHLORIDE 5 MG/1
1 TABLET ORAL
Qty: 5 | Refills: 0
Start: 2024-04-29

## 2024-04-29 RX ADMIN — Medication 975 MILLIGRAM(S): at 04:09

## 2024-04-29 RX ADMIN — Medication 600 MILLIGRAM(S): at 12:18

## 2024-04-29 RX ADMIN — Medication 600 MILLIGRAM(S): at 06:09

## 2024-04-29 RX ADMIN — HEPARIN SODIUM 5000 UNIT(S): 5000 INJECTION INTRAVENOUS; SUBCUTANEOUS at 09:29

## 2024-04-29 RX ADMIN — SODIUM CHLORIDE 3 MILLILITER(S): 9 INJECTION INTRAMUSCULAR; INTRAVENOUS; SUBCUTANEOUS at 06:09

## 2024-04-29 RX ADMIN — Medication 600 MILLIGRAM(S): at 13:15

## 2024-04-29 RX ADMIN — Medication 600 MILLIGRAM(S): at 17:33

## 2024-04-29 RX ADMIN — SODIUM CHLORIDE 3 MILLILITER(S): 9 INJECTION INTRAMUSCULAR; INTRAVENOUS; SUBCUTANEOUS at 14:08

## 2024-04-29 RX ADMIN — Medication 600 MILLIGRAM(S): at 00:11

## 2024-04-29 RX ADMIN — Medication 975 MILLIGRAM(S): at 09:29

## 2024-04-29 RX ADMIN — Medication 975 MILLIGRAM(S): at 03:09

## 2024-04-29 RX ADMIN — Medication 600 MILLIGRAM(S): at 01:11

## 2024-04-29 RX ADMIN — Medication 975 MILLIGRAM(S): at 10:20

## 2024-04-29 RX ADMIN — Medication 600 MILLIGRAM(S): at 18:07

## 2024-04-29 NOTE — PROGRESS NOTE ADULT - ASSESSMENT
A/P: 38y POD#4 s/p abd myomectomy, R ov cystectomy. .  POD 2 complicated by asymptomatic tachycardia with normal repeat CBC and EKG showing sinus tachycardia. Patient denies chest pain and SOB. Patient having increased incisional sensitivity and will not let providers touch sensitive area.    Neuro: PO pain med  CV: Hemodynamically stable.   -Hct: 36.6(PST)->28.4->28.4->27.9, f/u AM CBC  Pulm: Saturating well on room air, encourage oob/amb  GI: Continue regular diet  : voiding spontaneously   Heme: HSQ and SCDs for DVT ppx  FEN: SLIV   ID: Afebrile  WBC: 14->19->17->13 f/u AM CBC  Endo: No active issues   Dispo: Continue routine post-op care    Pt seen with GYN team  Favian Larsen PGY1

## 2024-04-29 NOTE — DISCHARGE NOTE NURSING/CASE MANAGEMENT/SOCIAL WORK - NSDCPNINST_GEN_ALL_CORE
Call MD for any temps above 100.4, pain with no relief from meds, nausea/emesis, difficulty urinating, problems with incisions, shortness of breath, chest pain.

## 2024-04-29 NOTE — DISCHARGE NOTE NURSING/CASE MANAGEMENT/SOCIAL WORK - PATIENT PORTAL LINK FT
You can access the FollowMyHealth Patient Portal offered by [facility] by registering at the following website: [email or web address]. By joining [facility]’s FollowMyHealth portal, you will also be able to view your health information using other applications (apps) compatible with our system.

## 2024-04-29 NOTE — PROGRESS NOTE ADULT - SUBJECTIVE AND OBJECTIVE BOX
POD#4   HD#5    Patient seen and examined at bedside. No acute overnight events. No acute complaints, pain well controlled.  Patient is ambulating. She is passing flatus. Voiding spontaneously and tolerating regular diet. Denies CP, SOB, N/V, fevers, and chills.    Vital Signs Last 24 Hours  T(C): 36.7 (04-29-24 @ 05:53), Max: 36.8 (04-28-24 @ 17:27)  HR: 90 (04-29-24 @ 05:53) (88 - 95)  BP: 130/90 (04-29-24 @ 05:53) (106/74 - 138/91)  RR: 18 (04-29-24 @ 05:53) (18 - 18)  SpO2: 100% (04-29-24 @ 05:53) (94% - 100%)    I&O's Summary      Physical Exam:  General: NAD  CV: RRR  Lungs: CTAB  Abdomen: Soft, non-tender, non-distended, +BS  Incision: C/D/I  Ext: No pain or swelling    Labs:                        8.6    13.46 )-----------( 318      ( 28 Apr 2024 06:50 )             27.9   baso x      eos x      imm gran x      lymph x      mono x      poly x                            8.7    17.51 )-----------( 322      ( 27 Apr 2024 15:17 )             28.4   baso 0.9    eos 0.0    imm gran x      lymph 9.6    mono 1.7    poly 87.8                         8.6    19.18 )-----------( 339      ( 27 Apr 2024 07:22 )             27.1   baso x      eos x      imm gran x      lymph x      mono x      poly x                            8.8    14.98 )-----------( 343      ( 26 Apr 2024 06:41 )             28.4   baso x      eos x      imm gran x      lymph x      mono x      poly x          MEDICATIONS  (STANDING):  heparin   Injectable 5000 Unit(s) SubCutaneous every 12 hours  ibuprofen  Tablet. 600 milliGRAM(s) Oral every 6 hours  influenza   Vaccine 0.5 milliLiter(s) IntraMuscular once  senna 2 Tablet(s) Oral at bedtime  sodium chloride 0.9% lock flush 3 milliLiter(s) IV Push every 8 hours    MEDICATIONS  (PRN):  acetaminophen     Tablet .. 975 milliGRAM(s) Oral every 6 hours PRN Mild Pain (1 - 3)  ondansetron Injectable 4 milliGRAM(s) IV Push every 6 hours PRN Nausea and/or Vomiting  oxyCODONE    IR 5 milliGRAM(s) Oral every 6 hours PRN Severe Pain (7 - 10)  simethicone 80 milliGRAM(s) Chew every 4 hours PRN Gas

## 2024-04-29 NOTE — PROGRESS NOTE ADULT - ATTENDING COMMENTS
GYN Attg:  Pt seen and assessed. I agree with above assessment and plan. Discharge planning.  PAULETTE Cotto MD
OB Attg:  Pt seen and examined. I agree with above assessment and plan. Discharge planning once meets criteria. CBC pending.  PAULETTE Cotto MD
MIG[employer] Fellow Addenum:    38y POD#2 s/p abd myomectomy, R cystectomy. . Pt reporting significant pain and refusing exam. No appetite but able to tolerate liquids. Voiding, but sometimes not making it to bathroom in time due to pain getting OOB. Ambulating with assistance. Mild tachycardia 90s-110s, mild HTN present. EKG sinus tach. Hgb stable 8.6 from 8.7, WBC downtrending to 13 from 17. At this time, continue to titrate pain control and reassess for possible discharge this afternoon.    D/w Dr. Kirti Apple MD  PGY-5, [employer] Fellow
GYN Attg:  Pt seen and examined. I agree with above assessment and plan. Routine postop care. cbc tomorrow.  PAULETTE Cotto MD

## 2024-04-30 ENCOUNTER — TRANSCRIPTION ENCOUNTER (OUTPATIENT)
Age: 39
End: 2024-04-30

## 2024-05-01 LAB — SURGICAL PATHOLOGY STUDY: SIGNIFICANT CHANGE UP

## 2024-05-03 ENCOUNTER — TRANSCRIPTION ENCOUNTER (OUTPATIENT)
Age: 39
End: 2024-05-03

## 2024-05-08 ENCOUNTER — APPOINTMENT (OUTPATIENT)
Dept: OBGYN | Facility: CLINIC | Age: 39
End: 2024-05-08
Payer: COMMERCIAL

## 2024-05-08 VITALS
WEIGHT: 148 LBS | BODY MASS INDEX: 27.23 KG/M2 | HEIGHT: 62 IN | DIASTOLIC BLOOD PRESSURE: 91 MMHG | SYSTOLIC BLOOD PRESSURE: 138 MMHG

## 2024-05-08 DIAGNOSIS — D25.9 LEIOMYOMA OF UTERUS, UNSPECIFIED: ICD-10-CM

## 2024-05-08 PROCEDURE — 99024 POSTOP FOLLOW-UP VISIT: CPT

## 2024-05-08 NOTE — CONSULT LETTER
[Dear  ___] : Dear ~TIAN, [Consult Letter:] : I had the pleasure of evaluating your patient, [unfilled]. [Please see my note below.] : Please see my note below. [Consult Closing:] : Thank you very much for allowing me to participate in the care of this patient.  If you have any questions, please do not hesitate to contact me. [Sincerely,] : Sincerely, [FreeTextEntry2] : Lakesha Newell, DNP [address], [address], [place] [id number] [FreeTextEntry3] : Kishore Cotto MD

## 2024-05-08 NOTE — END OF VISIT
[FreeTextEntry3] :    I, Patricia Shoshana, acted as a scribe on behalf of Dr. Kishore Cotto on 05/08/2024.   All medical entries made by the scribe were at my, Dr. Kishore Cotto's, direction and personally dictated by me on 05/08/2024. I have reviewed the chart and agree that the record accurately reflects my personal performance of the history, physical exam, assessment, and plan. I have also personally directed, reviewed, and agreed with the chart.

## 2024-05-08 NOTE — PLAN
[FreeTextEntry1] : 37 yo s/p xlap abdominal myomectomy and right ovarian cystectomy 4/25/24, stable, path benign - routine post-op care - pelvic rest - rto 4 weeks - pt to f/u with Dr. Newell for routine GYN care after next POV

## 2024-05-08 NOTE — HISTORY OF PRESENT ILLNESS
[Pain is well-controlled] : pain is well-controlled [Clean/Dry/Intact] : clean, dry and intact [Fever] : no fever [Chills] : no chills [Nausea] : no nausea [Vomiting] : no vomiting [Erythema] : not erythematous [Pathology reviewed] : pathology reviewed [de-identified] : 4/25/24 [de-identified] : xlap abdominal myomectomy and right ovarian cystectomy [de-identified] : fibroid uterus [de-identified] : 39 yo s/p xlap abdominal myomectomy and right ovarian cystectomy 4/25/24, discharged POD 4, no complications or complaints.  Operative Findings: Examination under anesthesia reveals normal female genitalia.  Enlarged anteverted uterus, approximately 16-18 week size, right adnexal fullness.  Intraoperative findings showed enlargement of uterus.   Normal right and left fallopian tubes and left ovary.  Right ovary with an approximately 5 cm dermoid cyst.  Pathology:  Accession:                             [id number]  Collected Date/Time:                   4/25/2024 14:14 EDT Received Date/Time:                    4/25/2024 14:14 EDT  Surgical Pathology Report - Auth (Verified)  Specimen(s) Submitted 1  Uterine fibroid 2  Right ovarian cyst  Final Diagnosis 1.   Uterine fibroid - Leiomyomata   2.   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 [address] Phone: [phone]   Fax: [phone] [de-identified] : abd soft, nt, nd

## 2024-05-29 ENCOUNTER — TRANSCRIPTION ENCOUNTER (OUTPATIENT)
Age: 39
End: 2024-05-29

## 2024-06-04 ENCOUNTER — APPOINTMENT (OUTPATIENT)
Dept: OBGYN | Facility: CLINIC | Age: 39
End: 2024-06-04
Payer: COMMERCIAL

## 2024-06-04 VITALS
WEIGHT: 148 LBS | DIASTOLIC BLOOD PRESSURE: 84 MMHG | HEIGHT: 62 IN | SYSTOLIC BLOOD PRESSURE: 118 MMHG | BODY MASS INDEX: 27.23 KG/M2

## 2024-06-04 DIAGNOSIS — Z09 ENCOUNTER FOR FOLLOW-UP EXAMINATION AFTER COMPLETED TREATMENT FOR CONDITIONS OTHER THAN MALIGNANT NEOPLASM: ICD-10-CM

## 2024-06-04 DIAGNOSIS — Z00.00 ENCOUNTER FOR GENERAL ADULT MEDICAL EXAMINATION W/OUT ABNORMAL FINDINGS: ICD-10-CM

## 2024-06-04 PROCEDURE — 99024 POSTOP FOLLOW-UP VISIT: CPT

## 2024-06-05 PROBLEM — Z09 ENCOUNTER FOR EXAMINATION FOLLOWING SURGERY: Status: ACTIVE | Noted: 2024-06-05 | Resolved: 2024-06-19

## 2024-06-05 NOTE — END OF VISIT
[FreeTextEntry3] : I Pee Evans, acted as a scribe on behalf of Dr. Kishore Cotto on 06/04/2024.  All medical entries made by this scribe where at my Dr. Kishore Cotto, direction and personally dictated by me on 06/04/2024.  I have reviewed the chart and agree that the record accurately reflects my personal performance of the history, physical exam, assessment, and plan. I have also personally directed, reviewed and agreed with the chart.

## 2024-06-05 NOTE — HISTORY OF PRESENT ILLNESS
[Pain is well-controlled] : pain is well-controlled [Clean/Dry/Intact] : clean, dry and intact [Healed] : healed [Pathology reviewed] : pathology reviewed [None] : no vaginal bleeding [Fever] : no fever [Chills] : no chills [Nausea] : no nausea [Vomiting] : no vomiting [Erythema] : not erythematous [Swelling] : not swollen [Dehiscence] : not dehisced [de-identified] : 4/25/2024 [de-identified] : xlap abdominal myomectomy and right ovarian cystectomy [de-identified] : fibroid uterus [de-identified] : 37 yo s/p xlap abdominal myomectomy and right ovarian cystectomy 4/25/24, discharged POD 4, no complications or complaints.  Operative Findings: Examination under anesthesia reveals normal female genitalia. Enlarged anteverted uterus, approximately 16-18 week size, right adnexal fullness. Intraoperative findings showed enlargement of uterus. Normal right and left fallopian tubes and left ovary. Right ovary with an approximately 5 cm dermoid cyst.  Pathology:  Accession: [id number]  Collected Date/Time: 4/25/2024 14:14 EDT Received Date/Time: 4/25/2024 14:14 EDT  Surgical Pathology Report - Auth (Verified)  Specimen(s) Submitted 1 Uterine fibroid 2 Right ovarian cyst  Final Diagnosis 1. Uterine fibroid - Leiomyomata   2. 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 [place] [address] Phone: [phone] Fax: [phone]  [de-identified] : abd soft, nt, nd

## 2024-06-05 NOTE — PLAN
[FreeTextEntry1] : 37 yo s/p xlap abdominal myomectomy and right ovarian cystectomy 24, stable.   - routine post-op care - mammo/sono -Due to deep myomectomy pt is not a candidate for vaginal delivery. Pt to wait 6-8 months to conceive. Pt to have primary  at 37-38 weeks due to risk of uterine rupture in labor. Pt verbalized full understanding. - pt to f/u with Dr. Newell for routine GYN care  -3 months for annual visit

## 2024-06-05 NOTE — CONSULT LETTER
[Dear  ___] : Dear  [unfilled], [Consult Letter:] : I had the pleasure of evaluating your patient, [unfilled]. [Please see my note below.] : Please see my note below. [Consult Closing:] : Thank you very much for allowing me to participate in the care of this patient.  If you have any questions, please do not hesitate to contact me. [Sincerely,] : Sincerely, [FreeTextEntry2] : Lakesha Newell   [address] [place] [address] [FreeTextEntry3] : Kishore Cotto MD

## 2024-06-05 NOTE — ASSESSMENT
[Doing Well] : is doing well [Excellent Pain Control] : has excellent pain control [No Sign of Infection] : is showing no signs of infection [de-identified] : 39 yo s/p xlap abdominal myomectomy and right ovarian cystectomy 4/25/24, stable.

## 2025-01-06 ENCOUNTER — APPOINTMENT (OUTPATIENT)
Dept: OBGYN | Facility: CLINIC | Age: 40
End: 2025-01-06
Payer: COMMERCIAL

## 2025-01-06 VITALS
HEIGHT: 62 IN | SYSTOLIC BLOOD PRESSURE: 138 MMHG | DIASTOLIC BLOOD PRESSURE: 88 MMHG | WEIGHT: 148 LBS | BODY MASS INDEX: 27.23 KG/M2

## 2025-01-06 DIAGNOSIS — Z11.3 ENCOUNTER FOR SCREENING FOR INFECTIONS WITH A PREDOMINANTLY SEXUAL MODE OF TRANSMISSION: ICD-10-CM

## 2025-01-06 DIAGNOSIS — Z01.419 ENCOUNTER FOR GYNECOLOGICAL EXAMINATION (GENERAL) (ROUTINE) W/OUT ABNORMAL FINDINGS: ICD-10-CM

## 2025-01-06 DIAGNOSIS — Z11.51 ENCOUNTER FOR SCREENING FOR HUMAN PAPILLOMAVIRUS (HPV): ICD-10-CM

## 2025-01-06 PROCEDURE — 36415 COLL VENOUS BLD VENIPUNCTURE: CPT

## 2025-01-06 PROCEDURE — 99395 PREV VISIT EST AGE 18-39: CPT

## 2025-01-07 LAB
C TRACH RRNA SPEC QL NAA+PROBE: NOT DETECTED
HBV SURFACE AG SER QL: NONREACTIVE
HCV AB SER QL: NONREACTIVE
HCV S/CO RATIO: 0.12 S/CO
HIV1+2 AB SPEC QL IA.RAPID: NONREACTIVE
HPV HIGH+LOW RISK DNA PNL CVX: NOT DETECTED
N GONORRHOEA RRNA SPEC QL NAA+PROBE: NOT DETECTED
SOURCE TP AMPLIFICATION: NORMAL

## 2025-01-08 LAB — T PALLIDUM AB SER QL IA: NEGATIVE

## 2025-01-10 LAB — CYTOLOGY CVX/VAG DOC THIN PREP: NORMAL

## 2025-07-16 PROBLEM — Z12.31 ENCOUNTER FOR SCREENING MAMMOGRAM FOR BREAST CANCER: Status: ACTIVE | Noted: 2025-07-16 | Resolved: 2025-07-30

## 2025-07-16 PROBLEM — R92.30 DENSE BREASTS: Status: ACTIVE | Noted: 2025-07-16

## 2025-08-14 ENCOUNTER — RESULT REVIEW (OUTPATIENT)
Age: 40
End: 2025-08-14

## 2025-08-14 ENCOUNTER — APPOINTMENT (OUTPATIENT)
Dept: ULTRASOUND IMAGING | Facility: CLINIC | Age: 40
End: 2025-08-14
Payer: COMMERCIAL

## 2025-08-14 ENCOUNTER — APPOINTMENT (OUTPATIENT)
Dept: MAMMOGRAPHY | Facility: CLINIC | Age: 40
End: 2025-08-14
Payer: COMMERCIAL

## 2025-08-14 ENCOUNTER — OUTPATIENT (OUTPATIENT)
Dept: OUTPATIENT SERVICES | Facility: HOSPITAL | Age: 40
LOS: 1 days | End: 2025-08-14
Payer: COMMERCIAL

## 2025-08-14 DIAGNOSIS — Z12.31 ENCOUNTER FOR SCREENING MAMMOGRAM FOR MALIGNANT NEOPLASM OF BREAST: ICD-10-CM

## 2025-08-14 DIAGNOSIS — R92.30 DENSE BREASTS, UNSPECIFIED: ICD-10-CM

## 2025-08-14 PROCEDURE — 77067 SCR MAMMO BI INCL CAD: CPT | Mod: 26

## 2025-08-14 PROCEDURE — 76641 ULTRASOUND BREAST COMPLETE: CPT | Mod: 26,50

## 2025-08-14 PROCEDURE — 76641 ULTRASOUND BREAST COMPLETE: CPT

## 2025-08-14 PROCEDURE — 77063 BREAST TOMOSYNTHESIS BI: CPT

## 2025-08-14 PROCEDURE — 77063 BREAST TOMOSYNTHESIS BI: CPT | Mod: 26

## 2025-08-14 PROCEDURE — 77067 SCR MAMMO BI INCL CAD: CPT

## (undated) DEVICE — SUT POLYSORB 2-0 30" V-20 UNDYED

## (undated) DEVICE — POSITIONER FOAM EGG CRATE ULNAR 2PCS (PINK)

## (undated) DEVICE — GOWN XL EXTRA LONG

## (undated) DEVICE — DRAPE TOWEL BLUE 17" X 24"

## (undated) DEVICE — PREP CHLORAPREP HI-LITE ORANGE 26ML

## (undated) DEVICE — SYR LUER LOK 10CC

## (undated) DEVICE — FOLEY HOLDER STATLOCK 2 WAY ADULT

## (undated) DEVICE — DRAPE MAYO STAND 30"

## (undated) DEVICE — SYR LUER LOK 20CC

## (undated) DEVICE — SUT POLYSORB 1 36" GS-21 UNDYED

## (undated) DEVICE — SOL IRR POUR NS 0.9% 500ML

## (undated) DEVICE — ABDOMINAL BINDER XL 9" X 62"-74"

## (undated) DEVICE — PACK MAJOR ABDOMINAL SUPINE

## (undated) DEVICE — Device

## (undated) DEVICE — DRAPE INSTRUMENT POUCH 6.75" X 11"

## (undated) DEVICE — SUT VLOC 180 0 12" GS-21 GREEN

## (undated) DEVICE — SPECIMEN CONTAINER 100ML

## (undated) DEVICE — WARMING BLANKET UPPER ADULT

## (undated) DEVICE — SUT VLOC 180 0 18" GS-21 GREEN

## (undated) DEVICE — SUT VLOC 180 0 9" GS-21 GREEN

## (undated) DEVICE — DRAPE LAVH 109" X 97.5" X 100"

## (undated) DEVICE — NDL HYPO REGULAR BEVEL 22G X 1.5" (TURQUOISE)

## (undated) DEVICE — SOL IRR POUR H2O 250ML

## (undated) DEVICE — BLADE SCALPEL SAFETYLOCK #10

## (undated) DEVICE — ELCTR BOVIE PENCIL SMOKE EVACUATION

## (undated) DEVICE — DRAPE LIGHT HANDLE COVER (BLUE)

## (undated) DEVICE — VENODYNE/SCD SLEEVE CALF MEDIUM

## (undated) DEVICE — BLADE SCALPEL SAFETYLOCK #15

## (undated) DEVICE — GLV 7.5 PROTEXIS (WHITE)

## (undated) DEVICE — SUT POLYSORB 0 30" GS-21 UNDYED

## (undated) DEVICE — DRSG OPSITE 13.75 X 4"

## (undated) DEVICE — SUT QUILL MONODERM 3-0 30CM PS-2

## (undated) DEVICE — LAP PAD 18 X 18"

## (undated) DEVICE — PREP CHLOROHEXIDINE 4% 118CC KIT

## (undated) DEVICE — FOLEY TRAY 16FR LF URINE METER SURESTEP